# Patient Record
Sex: MALE | Race: WHITE
[De-identification: names, ages, dates, MRNs, and addresses within clinical notes are randomized per-mention and may not be internally consistent; named-entity substitution may affect disease eponyms.]

---

## 2017-01-01 ENCOUNTER — PRIOR ORIGINAL RECORDS (OUTPATIENT)
Dept: OTHER | Age: 82
End: 2017-01-01

## 2017-01-01 ENCOUNTER — HOSPITAL ENCOUNTER (OUTPATIENT)
Age: 82
Discharge: ANOTHER HOSPITAL | End: 2017-01-01
Attending: FAMILY MEDICINE
Payer: MEDICARE

## 2017-01-01 ENCOUNTER — OFFICE VISIT (OUTPATIENT)
Dept: FAMILY MEDICINE CLINIC | Facility: CLINIC | Age: 82
End: 2017-01-01

## 2017-01-01 ENCOUNTER — TELEPHONE (OUTPATIENT)
Dept: FAMILY MEDICINE CLINIC | Facility: CLINIC | Age: 82
End: 2017-01-01

## 2017-01-01 ENCOUNTER — CHARTING TRANS (OUTPATIENT)
Dept: OTHER | Age: 82
End: 2017-01-01

## 2017-01-01 ENCOUNTER — APPOINTMENT (OUTPATIENT)
Dept: GENERAL RADIOLOGY | Facility: HOSPITAL | Age: 82
DRG: 253 | End: 2017-01-01
Attending: SURGERY
Payer: MEDICARE

## 2017-01-01 ENCOUNTER — PATIENT OUTREACH (OUTPATIENT)
Dept: FAMILY MEDICINE CLINIC | Facility: CLINIC | Age: 82
End: 2017-01-01

## 2017-01-01 ENCOUNTER — NURSE ONLY (OUTPATIENT)
Dept: FAMILY MEDICINE CLINIC | Facility: CLINIC | Age: 82
End: 2017-01-01

## 2017-01-01 ENCOUNTER — LAB SERVICES (OUTPATIENT)
Dept: OTHER | Age: 82
End: 2017-01-01

## 2017-01-01 ENCOUNTER — OFFICE VISIT (OUTPATIENT)
Dept: WOUND CARE | Age: 82
End: 2017-01-01
Attending: NURSE PRACTITIONER
Payer: MEDICARE

## 2017-01-01 ENCOUNTER — OFFICE VISIT (OUTPATIENT)
Dept: WOUND CARE | Facility: HOSPITAL | Age: 82
End: 2017-01-01
Attending: SURGERY
Payer: MEDICARE

## 2017-01-01 ENCOUNTER — SURGERY (OUTPATIENT)
Age: 82
End: 2017-01-01

## 2017-01-01 ENCOUNTER — APPOINTMENT (OUTPATIENT)
Dept: LAB | Age: 82
End: 2017-01-01
Attending: INTERNAL MEDICINE
Payer: MEDICARE

## 2017-01-01 ENCOUNTER — HOSPITAL ENCOUNTER (OUTPATIENT)
Dept: INTERVENTIONAL RADIOLOGY/VASCULAR | Facility: HOSPITAL | Age: 82
Discharge: HOME | End: 2017-01-01
Attending: SURGERY | Admitting: SURGERY
Payer: MEDICARE

## 2017-01-01 ENCOUNTER — OFFICE VISIT (OUTPATIENT)
Dept: HEMATOLOGY/ONCOLOGY | Facility: HOSPITAL | Age: 82
End: 2017-01-01
Attending: INTERNAL MEDICINE
Payer: MEDICARE

## 2017-01-01 ENCOUNTER — ANESTHESIA (OUTPATIENT)
Dept: CARDIAC SURGERY | Facility: HOSPITAL | Age: 82
DRG: 253 | End: 2017-01-01
Payer: MEDICARE

## 2017-01-01 ENCOUNTER — MED REC SCAN ONLY (OUTPATIENT)
Dept: FAMILY MEDICINE CLINIC | Facility: CLINIC | Age: 82
End: 2017-01-01

## 2017-01-01 ENCOUNTER — OFFICE VISIT (OUTPATIENT)
Dept: WOUND CARE | Facility: HOSPITAL | Age: 82
End: 2017-01-01
Attending: NURSE PRACTITIONER
Payer: MEDICARE

## 2017-01-01 ENCOUNTER — HOSPITAL ENCOUNTER (INPATIENT)
Facility: HOSPITAL | Age: 82
LOS: 3 days | Discharge: NURSING, REHAB OR LONG-TERM CARE | DRG: 253 | End: 2017-01-01
Attending: SURGERY | Admitting: SURGERY
Payer: MEDICARE

## 2017-01-01 ENCOUNTER — TELEPHONE (OUTPATIENT)
Dept: HEMATOLOGY/ONCOLOGY | Facility: HOSPITAL | Age: 82
End: 2017-01-01

## 2017-01-01 ENCOUNTER — ANESTHESIA EVENT (OUTPATIENT)
Dept: CARDIAC SURGERY | Facility: HOSPITAL | Age: 82
DRG: 253 | End: 2017-01-01
Payer: MEDICARE

## 2017-01-01 ENCOUNTER — LAB ENCOUNTER (OUTPATIENT)
Dept: LAB | Age: 82
End: 2017-01-01
Attending: INTERNAL MEDICINE
Payer: MEDICARE

## 2017-01-01 VITALS
HEART RATE: 64 BPM | BODY MASS INDEX: 24.25 KG/M2 | SYSTOLIC BLOOD PRESSURE: 120 MMHG | RESPIRATION RATE: 14 BRPM | HEIGHT: 68 IN | OXYGEN SATURATION: 87 % | WEIGHT: 160 LBS | TEMPERATURE: 99 F | DIASTOLIC BLOOD PRESSURE: 62 MMHG

## 2017-01-01 VITALS
DIASTOLIC BLOOD PRESSURE: 72 MMHG | RESPIRATION RATE: 14 BRPM | BODY MASS INDEX: 25 KG/M2 | SYSTOLIC BLOOD PRESSURE: 138 MMHG | TEMPERATURE: 98 F | WEIGHT: 165 LBS | HEART RATE: 68 BPM

## 2017-01-01 VITALS
OXYGEN SATURATION: 94 % | SYSTOLIC BLOOD PRESSURE: 138 MMHG | HEIGHT: 65.5 IN | BODY MASS INDEX: 25.52 KG/M2 | TEMPERATURE: 97 F | DIASTOLIC BLOOD PRESSURE: 84 MMHG | HEART RATE: 60 BPM | WEIGHT: 155 LBS | RESPIRATION RATE: 12 BRPM

## 2017-01-01 VITALS
HEART RATE: 76 BPM | SYSTOLIC BLOOD PRESSURE: 109 MMHG | WEIGHT: 175 LBS | OXYGEN SATURATION: 76 % | HEIGHT: 65 IN | BODY MASS INDEX: 29.16 KG/M2 | DIASTOLIC BLOOD PRESSURE: 49 MMHG | RESPIRATION RATE: 32 BRPM

## 2017-01-01 VITALS
TEMPERATURE: 98 F | HEIGHT: 66 IN | HEART RATE: 60 BPM | RESPIRATION RATE: 20 BRPM | SYSTOLIC BLOOD PRESSURE: 113 MMHG | BODY MASS INDEX: 26.61 KG/M2 | WEIGHT: 165.56 LBS | OXYGEN SATURATION: 97 % | DIASTOLIC BLOOD PRESSURE: 57 MMHG

## 2017-01-01 VITALS
SYSTOLIC BLOOD PRESSURE: 140 MMHG | RESPIRATION RATE: 13 BRPM | WEIGHT: 180 LBS | HEART RATE: 60 BPM | TEMPERATURE: 98 F | HEIGHT: 68 IN | BODY MASS INDEX: 27.28 KG/M2 | DIASTOLIC BLOOD PRESSURE: 70 MMHG | OXYGEN SATURATION: 95 %

## 2017-01-01 VITALS
HEART RATE: 60 BPM | WEIGHT: 168 LBS | RESPIRATION RATE: 18 BRPM | DIASTOLIC BLOOD PRESSURE: 49 MMHG | HEIGHT: 68 IN | BODY MASS INDEX: 25.46 KG/M2 | SYSTOLIC BLOOD PRESSURE: 107 MMHG | OXYGEN SATURATION: 93 %

## 2017-01-01 VITALS
TEMPERATURE: 98 F | DIASTOLIC BLOOD PRESSURE: 50 MMHG | SYSTOLIC BLOOD PRESSURE: 110 MMHG | HEART RATE: 66 BPM | RESPIRATION RATE: 18 BRPM

## 2017-01-01 VITALS
HEART RATE: 70 BPM | TEMPERATURE: 97 F | SYSTOLIC BLOOD PRESSURE: 136 MMHG | OXYGEN SATURATION: 92 % | DIASTOLIC BLOOD PRESSURE: 64 MMHG

## 2017-01-01 DIAGNOSIS — I70.244 ATHEROSCLEROSIS OF NATIVE ARTERIES OF LEFT LEG WITH ULCERATION OF HEEL AND MIDFOOT (HCC): ICD-10-CM

## 2017-01-01 DIAGNOSIS — I10 ESSENTIAL HYPERTENSION, BENIGN: ICD-10-CM

## 2017-01-01 DIAGNOSIS — I70.245 ATHEROSCLEROSIS OF NATIVE ARTERIES OF LEFT LEG WITH ULCERATION OF OTHER PART OF FOOT (HCC): ICD-10-CM

## 2017-01-01 DIAGNOSIS — R06.03 ACUTE RESPIRATORY DISTRESS: Primary | ICD-10-CM

## 2017-01-01 DIAGNOSIS — I70.222 ATHEROSCLEROSIS OF NATIVE ARTERIES OF EXTREMITIES WITH REST PAIN, LEFT LEG (HCC): Primary | ICD-10-CM

## 2017-01-01 DIAGNOSIS — Z13.6 SCREENING FOR CARDIOVASCULAR CONDITION: ICD-10-CM

## 2017-01-01 DIAGNOSIS — M79.605 PAIN OF LEFT LOWER EXTREMITY: Primary | ICD-10-CM

## 2017-01-01 DIAGNOSIS — D51.8 VITAMIN B12 DEFICIENCY (DIETARY) ANEMIA: Primary | ICD-10-CM

## 2017-01-01 DIAGNOSIS — D69.6 THROMBOCYTOPENIA (HCC): ICD-10-CM

## 2017-01-01 DIAGNOSIS — Z79.01 LONG-TERM (CURRENT) USE OF ANTICOAGULANTS: ICD-10-CM

## 2017-01-01 DIAGNOSIS — I48.20 CHRONIC ATRIAL FIBRILLATION (HCC): ICD-10-CM

## 2017-01-01 DIAGNOSIS — I25.10 CORONARY ATHEROSCLEROSIS OF NATIVE CORONARY ARTERY: Primary | ICD-10-CM

## 2017-01-01 DIAGNOSIS — N30.01 ACUTE CYSTITIS WITH HEMATURIA: Primary | ICD-10-CM

## 2017-01-01 DIAGNOSIS — I50.9 ACUTE ON CHRONIC CONGESTIVE HEART FAILURE, UNSPECIFIED CONGESTIVE HEART FAILURE TYPE: ICD-10-CM

## 2017-01-01 DIAGNOSIS — R35.0 URINARY FREQUENCY: ICD-10-CM

## 2017-01-01 DIAGNOSIS — R53.83 FATIGUE, UNSPECIFIED TYPE: ICD-10-CM

## 2017-01-01 DIAGNOSIS — D64.9 ANEMIA, UNSPECIFIED TYPE: ICD-10-CM

## 2017-01-01 DIAGNOSIS — D64.9 ANEMIA, UNSPECIFIED TYPE: Primary | ICD-10-CM

## 2017-01-01 DIAGNOSIS — Z13.1 SCREENING FOR DIABETES MELLITUS (DM): ICD-10-CM

## 2017-01-01 DIAGNOSIS — R60.9 EDEMA: ICD-10-CM

## 2017-01-01 DIAGNOSIS — Z23 NEED FOR VACCINATION: ICD-10-CM

## 2017-01-01 DIAGNOSIS — L97.522 FOOT ULCER, LEFT, WITH FAT LAYER EXPOSED (HCC): Primary | ICD-10-CM

## 2017-01-01 DIAGNOSIS — Z13.31 DEPRESSION SCREENING: ICD-10-CM

## 2017-01-01 DIAGNOSIS — R04.2 HEMOPTYSIS: ICD-10-CM

## 2017-01-01 DIAGNOSIS — I70.244 ATHEROSCLEROSIS OF NATIVE ARTERIES OF LEFT LEG WITH ULCERATION OF HEEL AND MIDFOOT (HCC): Primary | ICD-10-CM

## 2017-01-01 DIAGNOSIS — Z00.00 ENCOUNTER FOR ANNUAL HEALTH EXAMINATION: Primary | ICD-10-CM

## 2017-01-01 DIAGNOSIS — R06.00 DYSPNEA, UNSPECIFIED TYPE: ICD-10-CM

## 2017-01-01 DIAGNOSIS — R53.81 MALAISE: Primary | ICD-10-CM

## 2017-01-01 DIAGNOSIS — I25.10 ASHD (ARTERIOSCLEROTIC HEART DISEASE): ICD-10-CM

## 2017-01-01 LAB
ALBUMIN SERPL-MCNC: 3.1 G/DL (ref 3.5–4.8)
ALBUMIN SERPL-MCNC: 3.9 G/DL (ref 3.5–4.8)
ALBUMIN: 3.6 G/DL
ALKALINE PHOSPHATATE(ALK PHOS): 107 IU/L
ALKALINE PHOSPHATATE(ALK PHOS): 149 IU/L
ALP LIVER SERPL-CCNC: 109 U/L (ref 45–117)
ALP LIVER SERPL-CCNC: 149 U/L (ref 45–117)
ALT SERPL-CCNC: 20 U/L (ref 17–63)
ALT SERPL-CCNC: 23 U/L (ref 17–63)
ANTIBODY SCREEN: NEGATIVE
APTT PPP: 34.8 SECONDS (ref 25–34)
AST SERPL-CCNC: 14 U/L (ref 15–41)
AST SERPL-CCNC: 16 U/L (ref 15–41)
ATRIAL RATE: 68 BPM
BASOPHILS # BLD AUTO: 0.01 X10(3) UL (ref 0–0.1)
BASOPHILS NFR BLD AUTO: 0.1 %
BASOPHILS NFR BLD AUTO: 0.1 %
BASOPHILS NFR BLD AUTO: 0.2 %
BASOPHILS NFR BLD AUTO: 0.2 %
BILIRUB SERPL-MCNC: 0.7 MG/DL (ref 0.1–2)
BILIRUB SERPL-MCNC: 0.8 MG/DL (ref 0.1–2)
BILIRUBIN TOTAL: 0.8 MG/DL
BILIRUBIN TOTAL: 0.8 MG/DL
BLOOD TYPE BARCODE: 5100
BUN BLD-MCNC: 42 MG/DL (ref 8–20)
BUN BLD-MCNC: 49 MG/DL (ref 8–20)
BUN BLD-MCNC: 50 MG/DL (ref 8–20)
BUN BLD-MCNC: 51 MG/DL (ref 8–20)
BUN: 45 MG/DL
BUN: 50 MG/DL
BUN: 51 MG/DL
CALCIUM BLD-MCNC: 8.4 MG/DL (ref 8.3–10.3)
CALCIUM BLD-MCNC: 8.6 MG/DL (ref 8.3–10.3)
CALCIUM BLD-MCNC: 8.8 MG/DL (ref 8.3–10.3)
CALCIUM BLD-MCNC: 9 MG/DL (ref 8.3–10.3)
CALCIUM: 8.6 MG/DL
CALCIUM: 8.7 MG/DL
CALCIUM: 8.8 MG/DL
CHLORIDE: 101 MEQ/L
CHLORIDE: 101 MMOL/L (ref 101–111)
CHLORIDE: 103 MEQ/L
CHLORIDE: 103 MMOL/L (ref 101–111)
CHLORIDE: 104 MEQ/L
CHLORIDE: 104 MMOL/L (ref 101–111)
CHLORIDE: 105 MMOL/L (ref 101–111)
CHOLESTEROL, TOTAL: 149 MG/DL
CO2: 30 MMOL/L (ref 22–32)
CO2: 30 MMOL/L (ref 22–32)
CO2: 31 MMOL/L (ref 22–32)
CO2: 34 MMOL/L (ref 22–32)
CREAT BLD-MCNC: 1.2 MG/DL (ref 0.7–1.3)
CREAT BLD-MCNC: 1.27 MG/DL (ref 0.7–1.3)
CREAT BLD-MCNC: 1.38 MG/DL (ref 0.7–1.3)
CREAT BLD-MCNC: 2.05 MG/DL (ref 0.7–1.3)
CREATININE, SERUM: 1.27 MG/DL
CREATININE, SERUM: 1.4 MG/DL
CREATININE, SERUM: 2.05 MG/DL
DEPRECATED HBV CORE AB SER IA-ACNC: 104.7 NG/ML (ref 22–322)
EOSINOPHIL # BLD AUTO: 0.12 X10(3) UL (ref 0–0.3)
EOSINOPHIL # BLD AUTO: 0.13 X10(3) UL (ref 0–0.3)
EOSINOPHIL # BLD AUTO: 0.2 X10(3) UL (ref 0–0.3)
EOSINOPHIL # BLD AUTO: 0.28 X10(3) UL (ref 0–0.3)
EOSINOPHIL NFR BLD AUTO: 1.7 %
EOSINOPHIL NFR BLD AUTO: 1.7 %
EOSINOPHIL NFR BLD AUTO: 4 %
EOSINOPHIL NFR BLD AUTO: 4.3 %
ERYTHROCYTE [DISTWIDTH] IN BLOOD BY AUTOMATED COUNT: 12.7 % (ref 11.5–16)
ERYTHROCYTE [DISTWIDTH] IN BLOOD BY AUTOMATED COUNT: 12.8 % (ref 11.5–16)
ERYTHROCYTE [DISTWIDTH] IN BLOOD BY AUTOMATED COUNT: 13.2 % (ref 11.5–16)
ERYTHROCYTE [DISTWIDTH] IN BLOOD BY AUTOMATED COUNT: 13.8 % (ref 11.5–16)
ERYTHROCYTE [DISTWIDTH] IN BLOOD BY AUTOMATED COUNT: 14 % (ref 11.5–16)
ERYTHROCYTE [DISTWIDTH] IN BLOOD BY AUTOMATED COUNT: 15.4 % (ref 11.5–16)
FOLATE (FOLIC ACID), SERUM: 19.4 NG/ML (ref 8.7–24)
GLUCOSE BLD-MCNC: 103 MG/DL (ref 70–99)
GLUCOSE BLD-MCNC: 111 MG/DL (ref 65–99)
GLUCOSE BLD-MCNC: 112 MG/DL (ref 65–99)
GLUCOSE BLD-MCNC: 115 MG/DL (ref 70–99)
GLUCOSE BLD-MCNC: 117 MG/DL (ref 70–99)
GLUCOSE BLD-MCNC: 152 MG/DL (ref 70–99)
GLUCOSE: 103 MG/DL
GLUCOSE: 103 MG/DL
GLUCOSE: 115 MG/DL
HAV AB SERPL IA-ACNC: 279 PG/ML (ref 193–986)
HCT VFR BLD AUTO: 24.5 % (ref 37–53)
HCT VFR BLD AUTO: 25.5 % (ref 37–53)
HCT VFR BLD AUTO: 31 % (ref 37–53)
HCT VFR BLD AUTO: 32.2 % (ref 37–53)
HCT VFR BLD AUTO: 37.8 % (ref 37–53)
HCT VFR BLD AUTO: 39.4 % (ref 37–53)
HDL CHOLESTEROL: 73 MG/DL
HEMATOCRIT: 24.5 %
HEMATOCRIT: 25.5 %
HEMATOCRIT: 35.7 %
HEMATOCRIT: 37.8 %
HEMOGLOBIN: 11.9 G/DL
HEMOGLOBIN: 12.8 G/DL
HEMOGLOBIN: 7.6 G/DL
HEMOGLOBIN: 8.1 G/DL
HGB BLD-MCNC: 10.9 G/DL (ref 13–17)
HGB BLD-MCNC: 12.8 G/DL (ref 13–17)
HGB BLD-MCNC: 13.3 G/DL (ref 13–17)
HGB BLD-MCNC: 7.6 G/DL (ref 13–17)
HGB BLD-MCNC: 8.1 G/DL (ref 13–17)
HGB BLD-MCNC: 9.6 G/DL (ref 13–17)
IMMATURE GRANULOCYTE COUNT: 0.02 X10(3) UL (ref 0–1)
IMMATURE GRANULOCYTE COUNT: 0.03 X10(3) UL (ref 0–1)
IMMATURE GRANULOCYTE COUNT: 0.04 X10(3) UL (ref 0–1)
IMMATURE GRANULOCYTE COUNT: 0.04 X10(3) UL (ref 0–1)
IMMATURE GRANULOCYTE RATIO %: 0.4 %
IMMATURE GRANULOCYTE RATIO %: 0.4 %
IMMATURE GRANULOCYTE RATIO %: 0.6 %
IMMATURE GRANULOCYTE RATIO %: 0.6 %
INR BLD: 1.27 (ref 0.89–1.12)
INR BLD: 1.33 (ref 0.89–1.12)
INR BLD: 1.34 (ref 0.89–1.12)
INR BLD: 4.22 (ref 0.89–1.11)
INR: 1.5 (ref 0.8–1.3)
ISTAT ACTIVATED CLOTTING TIME: 152 SECONDS (ref 74–137)
ISTAT ACTIVATED CLOTTING TIME: 224 SECONDS (ref 74–137)
ISTAT ACTIVATED CLOTTING TIME: 250 SECONDS (ref 74–137)
ISTAT BLOOD GAS BASE DEFICIT: -2 MMOL/L
ISTAT BLOOD GAS BASE EXCESS: 3 MMOL/L
ISTAT BLOOD GAS HCO3: 22.2 MEQ/L (ref 22–26)
ISTAT BLOOD GAS HCO3: 26.9 MEQ/L (ref 22–26)
ISTAT BLOOD GAS O2 SATURATION: 96 % (ref 92–100)
ISTAT BLOOD GAS O2 SATURATION: 99 % (ref 92–100)
ISTAT BLOOD GAS PCO2: 33 MMHG (ref 35–45)
ISTAT BLOOD GAS PCO2: 38 MMHG (ref 35–45)
ISTAT BLOOD GAS PH: 7.44 (ref 7.35–7.45)
ISTAT BLOOD GAS PH: 7.46 (ref 7.35–7.45)
ISTAT BLOOD GAS PO2: 136 MMHG (ref 80–105)
ISTAT BLOOD GAS PO2: 76 MMHG (ref 80–105)
ISTAT BLOOD GAS TCO2: 23 MMOL/L (ref 22–32)
ISTAT BLOOD GAS TCO2: 28 MMOL/L (ref 22–32)
ISTAT HEMATOCRIT: 32 % (ref 37–54)
ISTAT HEMATOCRIT: 33 % (ref 37–54)
ISTAT IONIZED CALCIUM: 1.1 MMOL/L (ref 1.12–1.32)
ISTAT IONIZED CALCIUM: 1.13 MMOL/L (ref 1.12–1.32)
ISTAT POTASSIUM: 3.8 MMOL/L (ref 3.6–5.1)
ISTAT POTASSIUM: 3.9 MMOL/L (ref 3.6–5.1)
ISTAT SODIUM: 139 MMOL/L (ref 136–144)
ISTAT SODIUM: 139 MMOL/L (ref 136–144)
LDL CHOLESTEROL: 66 MG/DL
LYMPHOCYTES # BLD AUTO: 0.46 X10(3) UL (ref 0.9–4)
LYMPHOCYTES # BLD AUTO: 0.52 X10(3) UL (ref 0.9–4)
LYMPHOCYTES # BLD AUTO: 0.82 X10(3) UL (ref 0.9–4)
LYMPHOCYTES # BLD AUTO: 0.89 X10(3) UL (ref 0.9–4)
LYMPHOCYTES NFR BLD AUTO: 11.7 %
LYMPHOCYTES NFR BLD AUTO: 11.9 %
LYMPHOCYTES NFR BLD AUTO: 8 %
LYMPHOCYTES NFR BLD AUTO: 9.2 %
M PROTEIN MFR SERPL ELPH: 6.1 G/DL (ref 6.1–8.3)
M PROTEIN MFR SERPL ELPH: 7 G/DL (ref 6.1–8.3)
MCH RBC QN AUTO: 32.4 PG (ref 27–33.2)
MCH RBC QN AUTO: 32.6 PG (ref 27–33.2)
MCH RBC QN AUTO: 33.7 PG (ref 27–33.2)
MCH RBC QN AUTO: 33.8 PG (ref 27–33.2)
MCH RBC QN AUTO: 33.9 PG (ref 27–33.2)
MCH RBC QN AUTO: 34.2 PG (ref 27–33.2)
MCHC RBC AUTO-ENTMCNC: 31 G/DL (ref 31–37)
MCHC RBC AUTO-ENTMCNC: 31 G/DL (ref 31–37)
MCHC RBC AUTO-ENTMCNC: 31.8 G/DL (ref 31–37)
MCHC RBC AUTO-ENTMCNC: 33.8 G/DL (ref 31–37)
MCHC RBC AUTO-ENTMCNC: 33.9 G/DL (ref 31–37)
MCHC RBC AUTO-ENTMCNC: 33.9 G/DL (ref 31–37)
MCV RBC AUTO: 100 FL (ref 80–99)
MCV RBC AUTO: 101.1 FL (ref 80–99)
MCV RBC AUTO: 102 FL (ref 80–99)
MCV RBC AUTO: 105.2 FL (ref 80–99)
MCV RBC AUTO: 109.2 FL (ref 80–99)
MCV RBC AUTO: 99.7 FL (ref 80–99)
MMA: 0.57 UMOL/L
MONOCYTES # BLD AUTO: 0.66 X10(3) UL (ref 0.1–0.6)
MONOCYTES # BLD AUTO: 0.7 X10(3) UL (ref 0.1–0.6)
MONOCYTES # BLD AUTO: 0.74 X10(3) UL (ref 0.1–0.6)
MONOCYTES # BLD AUTO: 0.75 X10(3) UL (ref 0.1–0.6)
MONOCYTES NFR BLD AUTO: 10 %
MONOCYTES NFR BLD AUTO: 10.5 %
MONOCYTES NFR BLD AUTO: 10.7 %
MONOCYTES NFR BLD AUTO: 13.2 %
MRSA SCREEN PCR: NORMAL
NEUTROPHIL ABS PRELIM: 3.65 X10 (3) UL (ref 1.3–6.7)
NEUTROPHIL ABS PRELIM: 4.97 X10 (3) UL (ref 1.3–6.7)
NEUTROPHIL ABS PRELIM: 5.29 X10 (3) UL (ref 1.3–6.7)
NEUTROPHIL ABS PRELIM: 5.7 X10 (3) UL (ref 1.3–6.7)
NEUTROPHILS # BLD AUTO: 3.65 X10(3) UL (ref 1.3–6.7)
NEUTROPHILS # BLD AUTO: 4.97 X10(3) UL (ref 1.3–6.7)
NEUTROPHILS # BLD AUTO: 5.29 X10(3) UL (ref 1.3–6.7)
NEUTROPHILS # BLD AUTO: 5.7 X10(3) UL (ref 1.3–6.7)
NEUTROPHILS NFR BLD AUTO: 73 %
NEUTROPHILS NFR BLD AUTO: 75.4 %
NEUTROPHILS NFR BLD AUTO: 75.9 %
NEUTROPHILS NFR BLD AUTO: 76.2 %
PLATELET # BLD AUTO: 115 10(3)UL (ref 150–450)
PLATELET # BLD AUTO: 116 10(3)UL (ref 150–450)
PLATELET # BLD AUTO: 124 10(3)UL (ref 150–450)
PLATELET # BLD AUTO: 126 10(3)UL (ref 150–450)
PLATELET # BLD AUTO: 137 10(3)UL (ref 150–450)
PLATELET # BLD AUTO: 142 10(3)UL (ref 150–450)
PLATELETS: 125 K/UL
PLATELETS: 126 K/UL
PLATELETS: 137 K/UL
PLATELETS: 142 K/UL
POTASSIUM SERPL-SCNC: 4.1 MMOL/L (ref 3.6–5.1)
POTASSIUM SERPL-SCNC: 4.1 MMOL/L (ref 3.6–5.1)
POTASSIUM SERPL-SCNC: 4.6 MMOL/L (ref 3.6–5.1)
POTASSIUM SERPL-SCNC: 5.3 MMOL/L (ref 3.6–5.1)
POTASSIUM, SERUM: 4.1 MEQ/L
POTASSIUM, SERUM: 4.4 MEQ/L
POTASSIUM, SERUM: 5.3 MEQ/L
PROTEIN, TOTAL: 6.3 G/DL
PSA SERPL DL<=0.01 NG/ML-MCNC: 16.3 SECONDS (ref 12.3–14.8)
PSA SERPL DL<=0.01 NG/ML-MCNC: 16.9 SECONDS (ref 12.3–14.8)
PSA SERPL DL<=0.01 NG/ML-MCNC: 17 SECONDS (ref 12.3–14.8)
PSA SERPL DL<=0.01 NG/ML-MCNC: 41.8 SECONDS (ref 12–14.3)
Q-T INTERVAL: 480 MS
QRS DURATION: 202 MS
QTC CALCULATION (BEZET): 483 MS
R AXIS: -81 DEGREES
RBC # BLD AUTO: 2.33 X10(6)UL (ref 3.8–5.8)
RBC # BLD AUTO: 2.5 X10(6)UL (ref 3.8–5.8)
RBC # BLD AUTO: 2.84 X10(6)UL (ref 3.8–5.8)
RBC # BLD AUTO: 3.22 X10(6)UL (ref 3.8–5.8)
RBC # BLD AUTO: 3.74 X10(6)UL (ref 3.8–5.8)
RBC # BLD AUTO: 3.95 X10(6)UL (ref 3.8–5.8)
RED BLOOD COUNT: 2.33 X 10-6/U
RED BLOOD COUNT: 2.5 X 10-6/U
RED BLOOD COUNT: 3.61 X 10-6/U
RED BLOOD COUNT: 3.74 X 10-6/U
RED CELL DISTRIBUTION WIDTH-SD: 46.5 FL (ref 35.1–46.3)
RED CELL DISTRIBUTION WIDTH-SD: 46.7 FL (ref 35.1–46.3)
RED CELL DISTRIBUTION WIDTH-SD: 49.3 FL (ref 35.1–46.3)
RED CELL DISTRIBUTION WIDTH-SD: 51.4 FL (ref 35.1–46.3)
RED CELL DISTRIBUTION WIDTH-SD: 53.6 FL (ref 35.1–46.3)
RED CELL DISTRIBUTION WIDTH-SD: 62.2 FL (ref 35.1–46.3)
RETIC ABS CALC AUTO: 70.3 X10(3) UL (ref 22.5–147.5)
RETIC IRF CALC: 0.23 RATIO (ref 0.09–0.3)
RETIC%: 2.8 % (ref 0.5–2.5)
RETICULOCYTE HEMOGLOBIN EQUIVALENT: 28.2 PG (ref 28.2–36.3)
RH BLOOD TYPE: POSITIVE
SGOT (AST): 14 IU/L
SGOT (AST): 19 IU/L
SGPT (ALT): 20 IU/L
SGPT (ALT): 21 IU/L
SODIUM SERPL-SCNC: 136 MMOL/L (ref 136–144)
SODIUM SERPL-SCNC: 140 MMOL/L (ref 136–144)
SODIUM SERPL-SCNC: 142 MMOL/L (ref 136–144)
SODIUM SERPL-SCNC: 144 MMOL/L (ref 136–144)
SODIUM: 136 MEQ/L
SODIUM: 137 MEQ/L
SODIUM: 144 MEQ/L
T AXIS: 83 DEGREES
TRIGLYCERIDES: 50 MG/DL
TXT: NORMAL
VENTRICULAR RATE: 61 BPM
WBC # BLD AUTO: 10.3 X10(3) UL (ref 4–13)
WBC # BLD AUTO: 5 X10(3) UL (ref 4–13)
WBC # BLD AUTO: 5.9 X10(3) UL (ref 4–13)
WBC # BLD AUTO: 6.5 X10(3) UL (ref 4–13)
WBC # BLD AUTO: 7 X10(3) UL (ref 4–13)
WBC # BLD AUTO: 7.5 X10(3) UL (ref 4–13)
WHITE BLOOD COUNT: 5 X 10-3/U
WHITE BLOOD COUNT: 5.7 X 10-3/U
WHITE BLOOD COUNT: 5.9 X 10-3/U
WHITE BLOOD COUNT: 6.5 X 10-3/U

## 2017-01-01 PROCEDURE — 97597 DBRDMT OPN WND 1ST 20 CM/<: CPT

## 2017-01-01 PROCEDURE — 99213 OFFICE O/P EST LOW 20 MIN: CPT

## 2017-01-01 PROCEDURE — 82607 VITAMIN B-12: CPT | Performed by: FAMILY MEDICINE

## 2017-01-01 PROCEDURE — 36200 PLACE CATHETER IN AORTA: CPT

## 2017-01-01 PROCEDURE — G0444 DEPRESSION SCREEN ANNUAL: HCPCS | Performed by: FAMILY MEDICINE

## 2017-01-01 PROCEDURE — 85025 COMPLETE CBC W/AUTO DIFF WBC: CPT | Performed by: FAMILY MEDICINE

## 2017-01-01 PROCEDURE — 80053 COMPREHEN METABOLIC PANEL: CPT | Performed by: FAMILY MEDICINE

## 2017-01-01 PROCEDURE — 99215 OFFICE O/P EST HI 40 MIN: CPT

## 2017-01-01 PROCEDURE — 75774 ARTERY X-RAY EACH VESSEL: CPT

## 2017-01-01 PROCEDURE — 99203 OFFICE O/P NEW LOW 30 MIN: CPT

## 2017-01-01 PROCEDURE — 85027 COMPLETE CBC AUTOMATED: CPT | Performed by: SURGERY

## 2017-01-01 PROCEDURE — 99222 1ST HOSP IP/OBS MODERATE 55: CPT | Performed by: HOSPITALIST

## 2017-01-01 PROCEDURE — 83921 ORGANIC ACID SINGLE QUANT: CPT | Performed by: FAMILY MEDICINE

## 2017-01-01 PROCEDURE — 99152 MOD SED SAME PHYS/QHP 5/>YRS: CPT

## 2017-01-01 PROCEDURE — 85025 COMPLETE CBC W/AUTO DIFF WBC: CPT

## 2017-01-01 PROCEDURE — 87086 URINE CULTURE/COLONY COUNT: CPT | Performed by: FAMILY MEDICINE

## 2017-01-01 PROCEDURE — 85610 PROTHROMBIN TIME: CPT

## 2017-01-01 PROCEDURE — 96372 THER/PROPH/DIAG INJ SC/IM: CPT | Performed by: FAMILY MEDICINE

## 2017-01-01 PROCEDURE — 11042 DBRDMT SUBQ TIS 1ST 20SQCM/<: CPT

## 2017-01-01 PROCEDURE — 99214 OFFICE O/P EST MOD 30 MIN: CPT | Performed by: FAMILY MEDICINE

## 2017-01-01 PROCEDURE — 99214 OFFICE O/P EST MOD 30 MIN: CPT

## 2017-01-01 PROCEDURE — 71010 XR CHEST AP PORTABLE  (CPT=71010): CPT

## 2017-01-01 PROCEDURE — 82746 ASSAY OF FOLIC ACID SERUM: CPT | Performed by: FAMILY MEDICINE

## 2017-01-01 PROCEDURE — 93005 ELECTROCARDIOGRAM TRACING: CPT

## 2017-01-01 PROCEDURE — B41D1ZZ FLUOROSCOPY OF AORTA AND BILATERAL LOWER EXTREMITY ARTERIES USING LOW OSMOLAR CONTRAST: ICD-10-PCS | Performed by: SURGERY

## 2017-01-01 PROCEDURE — 80061 LIPID PANEL: CPT | Performed by: FAMILY MEDICINE

## 2017-01-01 PROCEDURE — 99232 SBSQ HOSP IP/OBS MODERATE 35: CPT | Performed by: HOSPITALIST

## 2017-01-01 PROCEDURE — 041L0JL BYPASS LEFT FEMORAL ARTERY TO POPLITEAL ARTERY WITH SYNTHETIC SUBSTITUTE, OPEN APPROACH: ICD-10-PCS | Performed by: SURGERY

## 2017-01-01 PROCEDURE — 36415 COLL VENOUS BLD VENIPUNCTURE: CPT

## 2017-01-01 PROCEDURE — 93010 ELECTROCARDIOGRAM REPORT: CPT | Performed by: INTERNAL MEDICINE

## 2017-01-01 PROCEDURE — 85027 COMPLETE CBC AUTOMATED: CPT | Performed by: FAMILY MEDICINE

## 2017-01-01 PROCEDURE — 99205 OFFICE O/P NEW HI 60 MIN: CPT | Performed by: INTERNAL MEDICINE

## 2017-01-01 PROCEDURE — 36247 INS CATH ABD/L-EXT ART 3RD: CPT

## 2017-01-01 PROCEDURE — 99153 MOD SED SAME PHYS/QHP EA: CPT

## 2017-01-01 PROCEDURE — 90670 PCV13 VACCINE IM: CPT | Performed by: FAMILY MEDICINE

## 2017-01-01 PROCEDURE — G0009 ADMIN PNEUMOCOCCAL VACCINE: HCPCS | Performed by: FAMILY MEDICINE

## 2017-01-01 PROCEDURE — 80048 BASIC METABOLIC PNL TOTAL CA: CPT | Performed by: SURGERY

## 2017-01-01 PROCEDURE — 75710 ARTERY X-RAYS ARM/LEG: CPT

## 2017-01-01 PROCEDURE — G0439 PPPS, SUBSEQ VISIT: HCPCS | Performed by: FAMILY MEDICINE

## 2017-01-01 DEVICE — GRAFT PROPATEN THIN 6MMX80CM: Type: IMPLANTABLE DEVICE | Site: LEG | Status: FUNCTIONAL

## 2017-01-01 RX ORDER — ALBUTEROL SULFATE 90 UG/1
2 AEROSOL, METERED RESPIRATORY (INHALATION) EVERY 4 HOURS PRN
Status: DISCONTINUED | OUTPATIENT
Start: 2017-01-01 | End: 2017-01-01

## 2017-01-01 RX ORDER — DIGOXIN 125 MCG
125 TABLET ORAL DAILY
Status: DISCONTINUED | OUTPATIENT
Start: 2017-01-01 | End: 2017-01-01

## 2017-01-01 RX ORDER — CEFAZOLIN SODIUM 1 G/3ML
INJECTION, POWDER, FOR SOLUTION INTRAMUSCULAR; INTRAVENOUS
Status: COMPLETED
Start: 2017-01-01 | End: 2017-01-01

## 2017-01-01 RX ORDER — SULFAMETHOXAZOLE AND TRIMETHOPRIM 800; 160 MG/1; MG/1
1 TABLET ORAL 2 TIMES DAILY
Qty: 20 TABLET | Refills: 0 | Status: SHIPPED | OUTPATIENT
Start: 2017-01-01 | End: 2017-01-01

## 2017-01-01 RX ORDER — CELECOXIB 200 MG/1
CAPSULE ORAL
Qty: 180 CAPSULE | Refills: 0 | Status: SHIPPED | OUTPATIENT
Start: 2017-01-01 | End: 2017-01-01

## 2017-01-01 RX ORDER — LISINOPRIL AND HYDROCHLOROTHIAZIDE 12.5; 1 MG/1; MG/1
TABLET ORAL
Qty: 30 TABLET | Refills: 11 | Status: SHIPPED | OUTPATIENT
Start: 2017-01-01

## 2017-01-01 RX ORDER — AMLODIPINE BESYLATE 5 MG/1
5 TABLET ORAL DAILY
Status: DISCONTINUED | OUTPATIENT
Start: 2017-01-01 | End: 2017-01-01

## 2017-01-01 RX ORDER — HYDROCODONE BITARTRATE AND ACETAMINOPHEN 5; 325 MG/1; MG/1
1 TABLET ORAL EVERY 6 HOURS PRN
Qty: 100 TABLET | Refills: 0 | Status: SHIPPED | OUTPATIENT
Start: 2017-01-01 | End: 2017-01-01

## 2017-01-01 RX ORDER — ACETAMINOPHEN 325 MG/1
650 TABLET ORAL EVERY 6 HOURS PRN
Status: DISCONTINUED | OUTPATIENT
Start: 2017-01-01 | End: 2017-01-01

## 2017-01-01 RX ORDER — CELECOXIB 200 MG/1
CAPSULE ORAL
Qty: 180 CAPSULE | Refills: 0 | Status: SHIPPED | OUTPATIENT
Start: 2017-01-01

## 2017-01-01 RX ORDER — NALOXONE HYDROCHLORIDE 0.4 MG/ML
80 INJECTION, SOLUTION INTRAMUSCULAR; INTRAVENOUS; SUBCUTANEOUS AS NEEDED
Status: ACTIVE | OUTPATIENT
Start: 2017-01-01 | End: 2017-01-01

## 2017-01-01 RX ORDER — ASPIRIN 300 MG
300 SUPPOSITORY, RECTAL RECTAL DAILY
Status: DISCONTINUED | OUTPATIENT
Start: 2017-01-01 | End: 2017-01-01

## 2017-01-01 RX ORDER — SODIUM CHLORIDE 9 MG/ML
INJECTION, SOLUTION INTRAVENOUS CONTINUOUS
Status: DISCONTINUED | OUTPATIENT
Start: 2017-01-01 | End: 2017-01-01

## 2017-01-01 RX ORDER — HYDROCODONE BITARTRATE AND ACETAMINOPHEN 5; 325 MG/1; MG/1
2 TABLET ORAL EVERY 6 HOURS PRN
Status: DISCONTINUED | OUTPATIENT
Start: 2017-01-01 | End: 2017-01-01

## 2017-01-01 RX ORDER — ASPIRIN 325 MG
325 TABLET, DELAYED RELEASE (ENTERIC COATED) ORAL DAILY
Status: DISCONTINUED | OUTPATIENT
Start: 2017-01-01 | End: 2017-01-01

## 2017-01-01 RX ORDER — CARVEDILOL 12.5 MG/1
25 TABLET ORAL 2 TIMES DAILY WITH MEALS
Status: DISCONTINUED | OUTPATIENT
Start: 2017-01-01 | End: 2017-01-01

## 2017-01-01 RX ORDER — ENOXAPARIN SODIUM 100 MG/ML
40 INJECTION SUBCUTANEOUS NIGHTLY
Status: DISCONTINUED | OUTPATIENT
Start: 2017-01-01 | End: 2017-01-01

## 2017-01-01 RX ORDER — WARFARIN SODIUM 2 MG/1
2 TABLET ORAL NIGHTLY
Status: DISCONTINUED | OUTPATIENT
Start: 2017-01-01 | End: 2017-01-01

## 2017-01-01 RX ORDER — NALOXONE HYDROCHLORIDE 0.4 MG/ML
80 INJECTION, SOLUTION INTRAMUSCULAR; INTRAVENOUS; SUBCUTANEOUS AS NEEDED
Status: DISPENSED | OUTPATIENT
Start: 2017-01-01 | End: 2017-01-01

## 2017-01-01 RX ORDER — LISINOPRIL AND HYDROCHLOROTHIAZIDE 12.5; 1 MG/1; MG/1
1 TABLET ORAL DAILY
Status: DISCONTINUED | OUTPATIENT
Start: 2017-01-01 | End: 2017-01-01

## 2017-01-01 RX ORDER — HEPARIN SODIUM 5000 [USP'U]/ML
INJECTION, SOLUTION INTRAVENOUS; SUBCUTANEOUS
Status: COMPLETED
Start: 2017-01-01 | End: 2017-01-01

## 2017-01-01 RX ORDER — FOLIC ACID 1 MG/1
1 TABLET ORAL DAILY
Status: DISCONTINUED | OUTPATIENT
Start: 2017-01-01 | End: 2017-01-01

## 2017-01-01 RX ORDER — ASPIRIN 81 MG/1
81 TABLET ORAL DAILY
Status: DISCONTINUED | OUTPATIENT
Start: 2017-01-01 | End: 2017-01-01

## 2017-01-01 RX ORDER — LEVOFLOXACIN 500 MG/1
500 TABLET, FILM COATED ORAL DAILY
Qty: 10 TABLET | Refills: 0 | Status: SHIPPED | OUTPATIENT
Start: 2017-01-01 | End: 2017-01-01

## 2017-01-01 RX ORDER — HYDROMORPHONE HYDROCHLORIDE 1 MG/ML
0.8 INJECTION, SOLUTION INTRAMUSCULAR; INTRAVENOUS; SUBCUTANEOUS EVERY 2 HOUR PRN
Status: DISCONTINUED | OUTPATIENT
Start: 2017-01-01 | End: 2017-01-01

## 2017-01-01 RX ORDER — PREDNISONE 20 MG/1
40 TABLET ORAL DAILY
Qty: 10 TABLET | Refills: 0 | Status: SHIPPED | OUTPATIENT
Start: 2017-01-01 | End: 2017-01-01

## 2017-01-01 RX ORDER — HYDROCODONE BITARTRATE AND ACETAMINOPHEN 5; 325 MG/1; MG/1
1 TABLET ORAL EVERY 6 HOURS PRN
COMMUNITY
End: 2017-01-01

## 2017-01-01 RX ORDER — BUPIVACAINE HYDROCHLORIDE 5 MG/ML
INJECTION, SOLUTION EPIDURAL; INTRACAUDAL AS NEEDED
Status: DISCONTINUED | OUTPATIENT
Start: 2017-01-01 | End: 2017-01-01

## 2017-01-01 RX ORDER — HYDROCODONE BITARTRATE AND ACETAMINOPHEN 5; 325 MG/1; MG/1
1 TABLET ORAL EVERY 6 HOURS PRN
Status: DISCONTINUED | OUTPATIENT
Start: 2017-01-01 | End: 2017-01-01

## 2017-01-01 RX ORDER — LIDOCAINE HYDROCHLORIDE 10 MG/ML
INJECTION, SOLUTION INFILTRATION; PERINEURAL
Status: COMPLETED
Start: 2017-01-01 | End: 2017-01-01

## 2017-01-01 RX ORDER — MIDAZOLAM HYDROCHLORIDE 1 MG/ML
INJECTION INTRAMUSCULAR; INTRAVENOUS
Status: COMPLETED
Start: 2017-01-01 | End: 2017-01-01

## 2017-01-01 RX ORDER — CYANOCOBALAMIN 1000 UG/ML
1000 INJECTION INTRAMUSCULAR; SUBCUTANEOUS ONCE
Status: COMPLETED | OUTPATIENT
Start: 2017-01-01 | End: 2017-01-01

## 2017-01-01 RX ORDER — HYDROCODONE BITARTRATE AND ACETAMINOPHEN 5; 325 MG/1; MG/1
1 TABLET ORAL EVERY 6 HOURS PRN
Qty: 100 TABLET | Refills: 0 | Status: SHIPPED | OUTPATIENT
Start: 2017-01-01

## 2017-01-01 RX ORDER — HYDROMORPHONE HYDROCHLORIDE 1 MG/ML
0.4 INJECTION, SOLUTION INTRAMUSCULAR; INTRAVENOUS; SUBCUTANEOUS EVERY 2 HOUR PRN
Status: DISCONTINUED | OUTPATIENT
Start: 2017-01-01 | End: 2017-01-01

## 2017-01-01 RX ORDER — LIDOCAINE HYDROCHLORIDE 10 MG/ML
INJECTION, SOLUTION EPIDURAL; INFILTRATION; INTRACAUDAL; PERINEURAL
Status: COMPLETED
Start: 2017-01-01 | End: 2017-01-01

## 2017-01-01 RX ORDER — HYDROMORPHONE HYDROCHLORIDE 1 MG/ML
1.2 INJECTION, SOLUTION INTRAMUSCULAR; INTRAVENOUS; SUBCUTANEOUS EVERY 2 HOUR PRN
Status: DISCONTINUED | OUTPATIENT
Start: 2017-01-01 | End: 2017-01-01

## 2017-01-01 RX ORDER — ONDANSETRON 2 MG/ML
4 INJECTION INTRAMUSCULAR; INTRAVENOUS EVERY 6 HOURS PRN
Status: DISCONTINUED | OUTPATIENT
Start: 2017-01-01 | End: 2017-01-01

## 2017-01-01 RX ORDER — HYDROMORPHONE HYDROCHLORIDE 1 MG/ML
0.4 INJECTION, SOLUTION INTRAMUSCULAR; INTRAVENOUS; SUBCUTANEOUS EVERY 5 MIN PRN
Status: DISCONTINUED | OUTPATIENT
Start: 2017-01-01 | End: 2017-01-01

## 2017-01-01 RX ORDER — HYDROMORPHONE HYDROCHLORIDE 1 MG/ML
0.4 INJECTION, SOLUTION INTRAMUSCULAR; INTRAVENOUS; SUBCUTANEOUS EVERY 5 MIN PRN
Status: DISPENSED | OUTPATIENT
Start: 2017-01-01 | End: 2017-01-01

## 2017-01-01 RX ORDER — SODIUM CHLORIDE, SODIUM LACTATE, POTASSIUM CHLORIDE, CALCIUM CHLORIDE 600; 310; 30; 20 MG/100ML; MG/100ML; MG/100ML; MG/100ML
INJECTION, SOLUTION INTRAVENOUS CONTINUOUS
Status: DISCONTINUED | OUTPATIENT
Start: 2017-01-01 | End: 2017-01-01

## 2017-01-01 RX ORDER — DEXTROSE AND SODIUM CHLORIDE 5; .45 G/100ML; G/100ML
INJECTION, SOLUTION INTRAVENOUS CONTINUOUS
Status: DISCONTINUED | OUTPATIENT
Start: 2017-01-01 | End: 2017-01-01

## 2017-01-01 RX ADMIN — SODIUM CHLORIDE: 9 INJECTION, SOLUTION INTRAVENOUS at 12:05:00

## 2017-01-01 RX ADMIN — CYANOCOBALAMIN 1000 MCG: 1000 INJECTION INTRAMUSCULAR; SUBCUTANEOUS at 15:34:00

## 2017-01-01 RX ADMIN — SODIUM CHLORIDE: 9 INJECTION, SOLUTION INTRAVENOUS at 15:00:00

## 2017-01-12 PROBLEM — I77.9 ARTERIAL DISEASE (HCC): Status: ACTIVE | Noted: 2017-01-01

## 2017-01-12 PROBLEM — M79.605 PAIN OF LEFT LOWER EXTREMITY: Status: ACTIVE | Noted: 2017-01-01

## 2017-01-20 NOTE — PROGRESS NOTES
CC: heel    HPI:     Location left side, posterior  Quality ulcerate  Severity moderate  Duration chronic  Context started out as some cracks in skin    ROS: no fever or chills, some drainage, no edema    Past Medical History   Diagnosis Date   • Unspecifi

## 2017-01-20 NOTE — TELEPHONE ENCOUNTER
Patients wife Frankey Rave calling stating that patient has three cracks on his left foot that are filled with pus. Area is red and painful. Declines fever. Patient is having issues applying pressure to the foot.   Patient wife states that patient has a high abbey

## 2017-01-27 NOTE — TELEPHONE ENCOUNTER
Tried to contact Bhavna at [facility] wound [facility] - missed call from their office. Unable to leave voice message.

## 2017-01-27 NOTE — TELEPHONE ENCOUNTER
Pt would to know if it is ok to wait to have his bone scan done after he has his stent procedure with Dr. Zachary Leone  Please advise if it is ok to wait.

## 2017-01-27 NOTE — TELEPHONE ENCOUNTER
It is better if he has it done prior to any procedure. If he has ongoing infection into his bone, this would put him at higher risk for complications for any procedure.

## 2017-01-30 NOTE — PROGRESS NOTES
Progress Note Details  Patient Name: Arabella Arroyo  Date: 1/23/2017   Patient Number: [id number] Physician / Tish King: Judith Cantu   Patient YOB: 1933 Facility: [facility]    Chief Complaint  This information was obtained 1/23/17: Initial visit to wound care. This patient arrives to the wound care with ulcerations to the left foot 5th toe and third toe and left heel. History of a left leg fracture with mike placement.  Went to Ortho, no pulse detected at that time and was ins Integumentary (Hair/Skin/Nails): Prone to Skin Tears (on coumadin), Change: Hair, Nails, Skin (No hair growth to lower legs, thin skin), Lumps (left shin location of mike), Ulcers (arterial ulcers to left heel and toes)    Patient denies complaints or sympt AICD left chest. PMI wnl. irregular. Nonpalpable pulses to the left foot. DP weak monophasic signal with hand held doppler, PT unable to obtain signal. Right DP PT palpable and with biphasic signals. . Capillary refill > 3sec left, cooler to touch when comp The periwound skin exhibited: Edema, Moist, Erythema. The temperature of the periwound skin is Warm. Periwound skin does not exhibit signs or symptoms of infection. Local Pulse is Weak.     Wound #3 Left Heel is an acute Full Thickness Arterial Ulcer and ha Betadine - paint all open areas  Thick Foam - in between the third and fourth toes and fourth and fifth toes.   Dry Gauze - TO heel  Kerlix  Paper tape    Follow-Up Appointments  Other: - Tomorrow in the wound care center to see Dr Aden Hudson #2 Left, Upon arrival and initial assessment pulse was felt in the left foot. Pulse was unobtainable in the PT and difficult to detect in the DP with hand held doppler after 10 minutes of being up on exam cart.  Ulcerations are stable but there is concern regarding Sanjeev Cross MSN, FNP-BC, [place], [place] 01/23/2017 20:10:33        Entered By: Sanjeev Cross on 01/23/2017 20:10:09

## 2017-01-31 NOTE — BRIEF OP NOTE
[facility]  Brief Endovascular Procedure Note     Cata Viramontes Location: CATH LAB   CSN [id number] MRN [id number]   Admission Date 1/31/2017 Operation Date 1/31/2017   Attending Physician Alexandrea Arrington MD Operating Physician Stacey James MD

## 2017-01-31 NOTE — PROGRESS NOTES
Swati Prater, [address] [facility]  Wound Care Clinic    VASCULAR SURGERY CONSULT NOTE    Date of visit: 2017  Name: Gavin Miller   :   1933  [id number]     REFERRING PHYSICIAN:  Marlney ref. PATIENT [place] PREOPERATIVE ORDER FOR IV ANTIBIOTIC SURGICAL SITE INFECTION PROPHYLAXIS.   10/3/2012    Comment Procedure: CAUDAL;  Surgeon: Mary Brock MD;  Location: [facility] FOR PAIN MANAGEMENT    PATIENT DOCUMENTED NOT TO HAVE EXPERIENCED A W/WO CONTRAST, DX/THERAPEUTIC SUBSTANCE, EPIDURAL/SUBARACHNOID; LUMBAR/SACRAL  6/19/2013    Comment Procedure: CAUDAL;  Surgeon: Leaan Glasgow MD;  Location: [address] NDL/CATH SPI DX/THER [address]  6/19/2013    Co EPIDURAL/SUBARACHNOID; LUMBAR/SACRAL  1/17/2014    Comment Procedure: CAUDAL;  Surgeon: Jaci Mandel MD;  Location: [address]ion[address] NDL/CATH SPI DX/THER NJX  1/17/2014    Comment Procedure: LUMBAR EPIDURAL;  Delmas Petty INFECTION PROPHYLAXIS.  N/A 7/9/2014    Comment Procedure: CAUDAL;  Surgeon: Astrid Maki MD;  Location: [facility] PAIN MANAGEMENT    PATIENT DOCUMENTED NOT TO HAVE EXPERIENCED ANY OF THE FOLLOWING EVENTS N/A 7/9/2014    Comment Procedure: CAUDAL; EPIDURAL/SUBARACHNOID; LUMBAR/SACRAL N/A 9/25/2015    Comment Procedure: CAUDAL;  Surgeon: Judi Suh MD;  Location: [facility] FOR PAIN MANAGEMENT    PATIENT [address] FOR IV ANTIBIOTIC SURGICAL SITE INFECTION PROPHYLAXIS.  N/A 9/2 MANAGEMENT        MEDICATIONS:     Current outpatient prescriptions:   •  HYDROcodone-acetaminophen (NORCO) 5-325 MG Oral Tab, Take 1 tablet by mouth every 6 (six) hours as needed for Pain., Disp: 100 tablet, Rfl: 0  •  Albuterol Sulfate HFA (PROAIR HFA) 1 apparent distress  PSYCH: normal mood and affect  HEENT: ears and throat are clear  NECK: supple, no lymphadenopathy, thyroid wnl  CAROTID: No bruits  RESPIRATORY: no rales, rhonchi, or wheezes B  CARDIO: RRR without murmur, no murmur, no gallop   ABDOMEN: questions. Sincerely,  Swati Lezama MD

## 2017-02-01 NOTE — PROGRESS NOTES
Rc'd pt from RN. Bedrest completed and pt stood up and made a couple of steps, pt does not ambulate. Groin stable. Reinforced previously reviewed d/c instructions. All questions answered. Home with wife via w/c in stable condition without c/o.

## 2017-02-06 PROBLEM — I70.244 ATHEROSCLEROSIS OF NATIVE ARTERY OF LEFT LOWER EXTREMITY WITH ULCERATION OF HEEL (HCC): Status: ACTIVE | Noted: 2017-01-01

## 2017-02-14 NOTE — TELEPHONE ENCOUNTER
Pt she got a hold of the physican regarding his surgery. Pt no longer had questions regarding surgery. Pt will  Call office if anything else is needed.

## 2017-02-16 NOTE — ICD/PM
No change to Med[employer] ICD for fem-pop bypass. Grounding pad on thigh, opposite side of ICD. Routine outpatient followup.   Ayana Weiner, NP

## 2017-02-20 PROBLEM — I70.249 ATHEROSCLEROSIS OF LEFT LOWER EXTREMITY WITH ULCERATION (HCC): Status: ACTIVE | Noted: 2017-01-01

## 2017-02-20 NOTE — CONSULTS
[facility]  Tessie [address] Patient Status:  Inpatient    1933 MRN [id number]   [facility] 6NE-A Attending Karthik Love MD   Hosp Day # 0 PCP Jose Verduzco DO     Reason for consult: medical management    Reque PATIENT DOCUMENTED NOT TO HAVE EXPERIENCED ANY OF THE FOLLOWING EVENTS  10/3/2012    Comment Procedure: CAUDAL;  Surgeon: Vandana Carnes MD;  Location: [facility] FOR PAIN MANAGEMENT    INJECTION, W/WO CONTRAST, DX/THERAPEUTIC SUBSTANCE, EPIDURAL/SUB Vanessa Kern NDL/CATH SPI DX/THER Chrissy Marin  6/19/2013    Comment Procedure: CAUDAL;  Surgeon: Jessenia Alexander MD;  Location: [facility] FOR PAIN MANAGEMENT    PATIENT [address] FOR IV ANTIBIOTIC SURGICAL SITE INFECTION PROPHYLAXIS.   6/19/2013    C Procedure: LUMBAR EPIDURAL;  Surgeon: Milo Watson MD;  Location: [address] [place] [address] FOR PAIN MANAGEMENT    PATIENT [address] [place] [address] FOR IV ANTIBIOTIC SURGICAL SITE INFECTION PROPHYLAXIS.   1/17/2014    Comment Procedure: LUMBAR EPIDURAL;  Surgeon: 7/9/2014    Comment Procedure: CAUDAL;  Surgeon: Nabor Astorga MD;  Location: [facility] FOR PAIN MANAGEMENT    INJECTION, W/WO CONTRAST, DX/THERAPEUTIC SUBSTANCE, EPIDURAL/SUBARACHNOID; LUMBAR/SACRAL N/A 7/23/2014    Comment Procedure: CAUDAL;  Abhay Rough SURGICAL SITE INFECTION PROPHYLAXIS.  N/A 9/25/2015    Comment Procedure: CAUDAL;  Surgeon: Shiva Garcia MD;  Location: [facility] FOR PAIN MANAGEMENT    PATIENT DOCUMENTED NOT TO HAVE EXPERIENCED ANY OF THE FOLLOWING EVENTS N/A 9/25/2015    Comment Pr He reports that he does not use illicit drugs.     Family History:   Family History   Problem Relation Age of Onset   • Cancer Father    • Cancer Maternal Grandmother    • Heart Disorder Brother    • Thyroid disease Mother      Thyroid issues in his mother atraumatic. Moist mucous membranes. EOM-I. Neck: No JVD. No carotid bruits. Respiratory: Clear to auscultation bilaterally. No wheezes. No rhonchi. Cardiovascular: Irregularly irregular   Chest and Back: No tenderness or deformity.   Abdomen: Soft, nont

## 2017-02-20 NOTE — ANESTHESIA PREPROCEDURE EVALUATION
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 Transvalvular     velocity was within the normal range. There was no stenosis. No     regurgitation. 3. Aortic root: The aortic root was dilated at the mid sinus level at 4.6     cm.  the ascending aorta above the stenosis. There was trivial regurgitation. Aortic valve:   Trileaflet; moderately thickened leaflets. Cusp separation  was normal.  Doppler:  Transvalvular velocity was within the normal range. There was no stenosis.  No regurgitation.   Tricuspid valve: ---------      Left atrium                                     Value        Reference   LA ID, A-P, ES, MM                      (H)     6.1   cm     3.0 - 4.0   LA ID/bsa, A-P, ES, MM                  (H)     3.2   cm/m^2 1. 5 - 2.3   LA/aortic root ratio, Lumbar spondylosis     Lumbar stenosis     Effusion of lower leg joint     Osteoarthrosis, unspecified whether generalized or localized, lower leg     Unspecified internal derangement of knee     Lumbosacral spondylosis without myelopathy     Degeneration EPIDURAL/SUBARACHNOID; LUMBAR/SACRAL  10/22/2012    Comment Procedure: CAUDAL;  Surgeon: Camryn Hernandez MD;  Location: [place] [address] [address] [place] NDL/CATH SPI DX/THER [address]  10/22/2012    Comment Procedure: CAUDAL;  Surgeon: Saray Cortes 6/19/2013    Comment Procedure: CAUDAL;  Surgeon: Leilani Stevens MD;  Location: [facility] FOR PAIN MANAGEMENT    PATIENT DOCUMENTED NOT TO HAVE EXPERIENCED ANY OF THE FOLLOWING EVENTS  6/19/2013    Comment Procedure: CAUDAL;  Surgeon: Leilani Stevens, Surgeon: Vandana Carnes MD;  Location: [facility] FOR PAIN MANAGEMENT    PATIENT DOCUMENTED NOT TO HAVE EXPERIENCED ANY OF THE FOLLOWING EVENTS  1/17/2014    Comment Procedure: LUMBAR EPIDURAL;  Surgeon: Vandana Carnes MD;  Location: [facility] Surgeon: Vandana Carnes MD;  Location: [address] [place] [place] NDL/CATH SPI DX/THER [address] N/A 7/23/2014    Comment Procedure: CAUDAL;  Surgeon: Vandana Carnes MD;  Location: [address] [place] [address] Procedure: CAUDAL;  Surgeon: Nathen Mckinnon MD;  Location: [facility] FOR PAIN MANAGEMENT    INJECTION, W/WO CONTRAST, DX/THERAPEUTIC SUBSTANCE, EPIDURAL/SUBARACHNOID; LUMBAR/SACRAL N/A 2/5/2016    Comment Procedure: CAUDAL;  Surgeon: Nathen Mckinnon, 33.8 02/20/2017   RDW 12.7 02/20/2017   .0* 02/20/2017       Lab Results  Component Value Date    01/31/2017   K 5.3* 01/31/2017    01/31/2017   CO2 30.0 01/31/2017   BUN 50* 01/31/2017   CREATSERUM 2.05* 01/31/2017   * 01/31/2017

## 2017-02-20 NOTE — CONSULTS
[facility]    Report of Consultation    Juan R Migdalia Patient Status:  Inpatient    1933 MRN [id number]   [facility] 6NE-A Attending Esme Viera MD   Hosp Day # 0 PCP Lzu Velasco DO     Date of Admission:  2017  D Comment Procedure: CAUDAL;  Surgeon: Leilani Stevens MD;  Location: [facility] FOR PAIN MANAGEMENT    PATIENT DOCUMENTED NOT TO HAVE EXPERIENCED ANY OF THE FOLLOWING EVENTS  10/3/2012    Comment Procedure: CAUDAL;  Surgeon: Leilani Stevens MD;  Lakshmi Loredo Procedure: CAUDAL;  Surgeon: Sajan Lakhani MD;  Location: [address] [facility] NDL/CATH SPI DX/THER Balaji Inman  6/19/2013    Comment Procedure: CAUDAL;  Surgeon: Sajan Lakhani MD;  Location: [address] [place] [address] Location: [place] [address] [address] [place] NDL/CATH SPI DX/THER NJX  1/17/2014    Comment Procedure: LUMBAR EPIDURAL;  Surgeon: Nicole Johnson MD;  Location: [facility] FOR PAIN MANAGEMENT    PATIENT [place] PREOPERATIVE ORDER FOR I [facility] FOR PAIN MANAGEMENT    PATIENT DOCUMENTED NOT TO HAVE EXPERIENCED ANY OF THE FOLLOWING EVENTS N/A 7/9/2014    Comment Procedure: CAUDAL;  Surgeon: Ashish Ellis MD;  Location: [facility] PAIN MANAGEMENT    INJECTION, W/WO CONTRAST, DX/TH MD;  Location: [facility] FOR PAIN MANAGEMENT    PATIENT [address] FOR IV ANTIBIOTIC SURGICAL SITE INFECTION PROPHYLAXIS.  N/A 9/25/2015    Comment Procedure: CAUDAL;  Surgeon: Herman Quinn MD;  Location: [address] [place] [address] Problem Relation Age of Onset   • Cancer Father    • Cancer Maternal Grandmother    • Heart Disorder Brother    • Thyroid disease Mother      Thyroid issues in his mother      reports that he quit smoking about 11 years ago.  He has never used smokeless t Q2H PRN **OR** HYDROmorphone HCl PF (DILAUDID) 1 MG/ML injection 0.8 mg, 0.8 mg, Intravenous, Q2H PRN **OR** HYDROmorphone HCl PF (DILAUDID) 1 MG/ML injection 1.2 mg, 1.2 mg, Intravenous, Q2H PRN  •  0.9%  NaCl infusion, , Intravenous, Continuous  •  [STAR joint     Osteoarthrosis, unspecified whether generalized or localized, lower leg     Unspecified internal derangement of knee     Lumbosacral spondylosis without myelopathy     Degeneration of lumbar or lumbosacral intervertebral disc     Lumbago     Recardo Lock

## 2017-02-20 NOTE — H&P
Swati Betts, [address] [facility]  [address]     VASCULAR SURGERY CONSULT NOTE      Name: Renetta Cee   :   1933  [id number]     REFERRING PHYSICIAN: No att. providers found  PRIMARY aneuyrisum     CATARACT      HIP REPLACEMENT SURGERY      INJECTION, W/WO CONTRAST, DX/THERAPEUTIC SUBSTANCE, EPIDURAL/SUBARACHNOID; LUMBAR/SACRAL  10/3/2012    Comment Procedure: CAUDAL;  Surgeon: Camryn Hernandez MD;  Location: [address] [place] [address] PAIN MANAGEMENT    PATIENT [place] PREOPERATIVE ORDER FOR IV ANTIBIOTIC SURGICAL SITE INFECTION PROPHYLAXIS.  1/29/2013    Comment Procedure: CAUDAL;  Surgeon: Tita Castorena MD;  Location: [facility] FOR PAIN MANAGEMENT    PATIENT DOCUMENTED NOT TO HA INJECTION, W/WO CONTRAST, DX/THERAPEUTIC SUBSTANCE, EPIDURAL/SUBARACHNOID; LUMBAR/SACRAL  9/12/2013    Comment Procedure: CAUDAL;  Surgeon: Soco Ashley MD;  Location: [facility] FOR PAIN MANAGEMENT    EPIDUROGRAPHY, RADIOLOGICAL S & I  9/12/2013 DX/THERAPEUTIC SUBSTANCE, EPIDURAL/SUBARACHNOID; LUMBAR/SACRAL N/A 7/9/2014    Comment Procedure: CAUDAL;  Surgeon: Alana Tolbert MD;  Location: [place] [address] [address] NDL/CATH SPI DX/THER [address] N/A 7/9/2014    Comment [place] ANTIBIOTIC SURGICAL SITE INFECTION PROPHYLAXIS.  N/A 12/16/2014    Comment Procedure: CAUDAL;  Surgeon: Rocio Alonso MD;  Location: [facility] PAIN MANAGEMENT    PATIENT DOCUMENTED NOT TO HAVE EXPERIENCED ANY OF THE FOLLOWING EVENTS N/A 12/16/2014 Emmie Runner, MD;  Location: [address] [place] [address]    PATIENT [address] [place] [address] FOR IV ANTIBIOTIC SURGICAL SITE INFECTION PROPHYLAXIS.  N/A 6/14/2016    Comment Procedure: CAUDAL;  Surgeon: Gregg Callejas MD;  Location: [address] [place] [address] monophasic signal  monophasic signal  monophasic signal  none      Has superficial ulceration along the heel, 3rd and 5th toes with no tendon or bone exposure                 Diagnostic Data:      LABS:  Recent Labs   Lab  02/20/17   0630   WBC  7.5   HGB diffuse calcified disease   throughout the femoral and infrapopliteal arteries with flow where   visualized. - Left: Moderate arterial disease with monophasic wrist waveforms and an LADARIUS   of 0.50. The absolute second digit pressure is 14mmHg.  Duplex imagin pressures may be unreliable due to arterial calcification or the presence of extensive pedal or digital disease.  The accuracy of toe pressures may be affected by several technical and environmental variables and these results should be interpreted in light inflow disease and chronic occlusion of the proximal   superficial femoral artery. ---------------------------------------------------------------------------- Findings: Right common femoral:  Moderate diffuse disease.   Distal vessel lesion: There is a 50- superficial femoral - mid      !-7.5cm/s ! +------------------------------------+---------+ ! Left superficial femoral - distal   !-13cm/s  ! +------------------------------------+---------+ ! Left popliteal - proximal           !14.7cm/s ! +---------------- Phone: [phone]                                   Fax: [phone] ---------------------------------------------------------------------------- Vein Mapping Patient: Aren Pretty Date: Feb 10 2017 4:15PM :     1933 (83 !2.60mm! +----+---------------------+------+ ! SSV ! Right popliteal fossa! 2.40mm! +----+---------------------+------+ ! SSV ! Proximal right calf  ! 2.00mm! +----+---------------------+------+ ! SSV ! Mid right calf       ! 2.70mm!  +----+---------------------+--- 8/05/2015, 17:32. INDICATIONS:  pre vascular surgery  FINDINGS:  There is a dual-lead left-sided permanent AICD pacemaker with leads over the right atrium and right ventricle.  There is stable mild cardiomegaly and a calcified, tortuous, ectatic thoracic a

## 2017-02-20 NOTE — ANESTHESIA POSTPROCEDURE EVALUATION
Cameron Dorado Patient Status:  Inpatient   Age/Gender 80year old male MRN [id number]   [facility] 6NE-A Attending Jennifer Burton MD   Hosp Day # 0 PCP Kwadwo Red DO       Anesthesia Post-op Note    Procedure(s):  left

## 2017-02-20 NOTE — PLAN OF CARE
Patient and wife stated that Matthew De La O has a dnr form at home and originally he stated he did not want cpr. He then changed his mind and  told me he wants everything done for now and he does want cpr for this hospitalization.  His wife is at bedside and she agre

## 2017-02-20 NOTE — BRIEF OP NOTE
Patients Name: Shemar Haney  Attending Physician: Day Joy MD  Operating Physician: Ernesto Carl MD  CSN: [id number]     Location:  OR  MRN: [id number]    YOB: 1933  Admission Date: 2/20/2017  Operation Date: 2/20/2017    Brief Op

## 2017-02-20 NOTE — HISTORICAL OFFICE NOTE
[place]  : 1933  ACCOUNT:  [id number]  [phone]  PCP: Dr. Ariel Laureano     TODAY'S DATE: 02/15/2017  DICTATED BY:  Berwyn Haines Frommelt, APN]    CHIEF COMPLAINT: [Cardiac Clearance and Hospital Discharge.]    HPI: [On 02/15/2017, Richardson Conner an CV: rest pain LLE, denies chest pain. RESP: denies chronic cough, hemoptysis. GI: denies melena, hematochezia. : no hematuria. INTEG: no new rashes, lesions. MS: back pain, limiting arthritis and really bad lately. NEURO: no localized deficits.  HEM/LYMPH on left. EXT: no peripheral edema. ASSESSMENT:  1. . CAD, of native vessels  2. Pre-operative evaluation  3. Abnormal EKG  4. Aneurysm, abdominal/s/p repair  5. Cardiac arrest, cause unspecified  6. Carotid artery stenosis, bilateral  7.  Coumadin Manag *Lisinopril-Hydrochlor10-12.5M  1 TABLET TWICE DAILY FOR BLOOD PRESS     10/05/16 *Digoxin              125MCG    1 TABLET DAILY.                          09/16/16 *Warfarin Sodium      2MG       1 TO 2 TABLETS BY MOUTH EVERY DAY AS     03/14/16 *AmLODIPin denies melena, hematochezia. : no hematuria. INTEG: no new rashes, lesions. MS: back pain, limiting arthritis and really bad lately. NEURO: no localized deficits. HEM/LYMPH: denies easy bruising. ALL: no new food or environmental allergies.     PAST HISTO rhythm; soft aortic sclerotic murmur. CAROTIDS: carotid pulses normal. ABD AORTA: deferred. FEM: deferred. PEDAL: deferred. EXT: no peripheral edema. DECISION MAKING: An 68-year-old gentleman with compensated ischemic cardiomyopathy.   Associated periphe MOUTH EVERY EVENING AT B  09/09/14 *Warfarin Sodium      2MG       1 TABLET BY MOUTH EVERY DAY AS [facility]  08/26/15 Symbicort             160-4.5M  Once daily, unsure of strength  09/09/14 Hydrocodone-Acetaminop10-325MG  1 TABLET EVERY 4 TO 6 HOURS AS NEEDE

## 2017-02-21 NOTE — PROGRESS NOTES
[facility]  Vascular Surgery Progress Note    Nadyne [place] Patient Status:  Inpatient    1933 MRN [id number]   [facility] 6NE-A Attending Anabel Hair MD   Hosp Day # 1 PCP Bouchra Franco DO     Objective:   Temp: 97.8 °F (36 atrial fibrillation (HCC)     Benign essential HTN        Medications:    Current Facility-Administered Medications:  folic acid (FOLVITE) tab 1 mg 1 mg Oral Daily   AmLODIPine Besylate (NORVASC) tab 5 mg 5 mg Oral Daily   carvedilol (COREG) tab 25 mg 25 m MD  2/21/2017  9:20 AM

## 2017-02-21 NOTE — PLAN OF CARE
Patient alert and oriented, converses appropriately, wife at bedside. Left foot warm, pulses by doppler, dressings intact with small amount of drainage to lower dressing.  Patient is tolerating PO well, urine per catheter, pain is controlled with PO Norco.

## 2017-02-21 NOTE — PROGRESS NOTES
Seen and examined. Up in chair eating breakfast. Feels well. Nursing notes regarding apnea reviewed.     Afebrile  139/68    Tele primarily paced    Lungs hyperinflated but clear  Ht RRR at present  abd soft  Ext left foot warm -- good coloring    42/1.2

## 2017-02-21 NOTE — PHYSICAL THERAPY NOTE
PHYSICAL THERAPY EVALUATION - INPATIENT     Room Number: 6809/8475-L  Evaluation Date: 2/21/2017  Type of Evaluation: Initial  Physician Order: PT Eval and Treat    Presenting Problem: left femoral-popliteal bypass 2/20/17  Reason for Therapy: Mobility THE FOLLOWING EVENTS  10/3/2012    Comment Procedure: CAUDAL;  Surgeon: Sajan Lakhani MD;  Location: [facility] FOR PAIN MANAGEMENT    INJECTION, W/WO CONTRAST, DX/THERAPEUTIC SUBSTANCE, EPIDURAL/SUBARACHNOID; LUMBAR/SACRAL  10/22/2012    Comment Proce Procedure: CAUDAL;  Surgeon: Lina Loredo MD;  Location: [facility] FOR PAIN MANAGEMENT    PATIENT [address] FOR IV ANTIBIOTIC SURGICAL SITE INFECTION PROPHYLAXIS.   6/19/2013    Comment Procedure: CAUDAL;  Surgeon: Lina Loredo, Location: [facility] FOR [facility]IN [facility]NAGEMENT    PATIENT [address] [place] [address] FOR IV ANTIBIOTIC SURGICAL SITE INFECTION PROPHYLAXIS.   1/17/2014    Comment Procedure: LUMBAR EPIDURAL;  Surgeon: Ashish Ellis MD;  Location: [address] [place] [address] Ankit Laurent MD;  Location: [address] [place] [address]    INJECTION, W/WO CONTRAST, DX/THERAPEUTIC SUBSTANCE, EPIDURAL/SUBARACHNOID; LUMBAR/SACRAL N/A 7/23/2014    Comment Procedure: CAUDAL;  Surgeon: Katia Benítez MD;  Location: [address] [place] [address] Procedure: CAUDAL;  Surgeon: Luis Alberto Basurto MD;  Location: [facility] FOR PAIN MANAGEMENT    PATIENT DOCUMENTED NOT TO HAVE EXPERIENCED ANY OF THE FOLLOWING EVENTS N/A 9/25/2015    Comment Procedure: CAUDAL;  Surgeon: Luis Alberto Basurto MD;  Location:  Rolling walker;Cane;Other (Comment) (electric wheelchair)  Patient Regularly Uses: Glasses    Prior Level of [place]: Ambulates short distances in the house with cane. In community short distances with rolling walker.  Electric wheelchair when [employer] -   Moving to and from a bed to a chair (including a wheelchair)?: A Lot   -   Need to walk in hospital room?: A Lot   -   Climbing 3-5 steps with a railing?: Total       AM-PAC Score:  Raw Score: 13   PT Approx Degree of Impairment Score: 64.91%   Stand mobility. Needs encouragement to increase activity level. DISCHARGE RECOMMENDATIONS  PT Discharge Recommendations: Sub-acute rehabilitation (ELOS 11 to 14 days)    PLAN  PT Treatment Plan: Bed mobility; Energy conservation;Gait training;Stair training

## 2017-02-21 NOTE — OPERATIVE REPORT
[facility]    PATIENT'S NAME: [address]   ATTENDING PHYSICIAN: Swati Golden M.D. OPERATING PHYSICIAN: Swati Golden M.D.    PATIENT ACCOUNT#:   [de-identified]    LOCATION:  [facility] 1 EDW 10  MEDICAL RECORD #:   [id number]       DATE OF VIANEY difficult. PROCEDURE:  The patient was brought to the catheterization lab and laid supine on the table. After induction of sedation, both groins were prepped and draped in the usual surgical sterile fashion.   A micropuncture needle was then used to a different angles and was able to image the majority of the peroneal artery and determined that the patient will benefit from a femoral to below-knee popliteal artery or perhaps the peroneal artery depending on the calcification present.   At this point, bot

## 2017-02-21 NOTE — CONSULTS
[facility]  Report of Inpatient Wound Care Consultation     Tim Hendrix Ramon Patient Status:  Inpatient    1933 MRN [id number]   [facility] 6NE-A Attending Trixie Lerma MD   Hosp Day # 1 PCP Jay Schilling,      REASON FOR CON SITE INFECTION PROPHYLAXIS.   10/3/2012    Comment Procedure: CAUDAL;  Surgeon: Nabor Astorga MD;  Location: [facility] FOR PAIN MANAGEMENT    PATIENT DOCUMENTED NOT TO HAVE EXPERIENCED ANY OF THE FOLLOWING EVENTS  10/3/2012    Comment Procedure: Derek Pondera LUMBAR/SACRAL  6/19/2013    Comment Procedure: CAUDAL;  Surgeon: Charyl Hatchet, MD;  Location: [address] NDL/CATH SPI DX/THER [address] Meh[address] Rodriguez 84  6/19/2013    Comment Procedure: CAUDAL;  Surgeon: Charyl Hatchet, MD;  Gustavo Campbell Surgeon: Alana Tolbert MD;  Location: [address] [place] [address] NDL/CATH SPI DX/[facility]  1/17/2014    Comment Procedure: LUMBAR EPIDURAL;  Surgeon: Alana Tolbert MD;  Location: [address] [place] [address] MANAGEMENT    PATIENT W Bala Riggins MD;  Location: [facility] FOR PAIN MANAGEMENT    PATIENT DOCUMENTED NOT TO HAVE EXPERIENCED ANY OF THE FOLLOWING EVENTS N/A 7/9/2014    Comment Procedure: CAUDAL;  Surgeon: Bala Riggins MD;  Location: [address] [place] [address] CAUDAL;  Surgeon: Nathen Mckinnon MD;  Location: [facility] FOR PAIN MANAGEMENT    PATIENT [address] FOR IV ANTIBIOTIC SURGICAL SITE INFECTION PROPHYLAXIS.  N/A 9/25/2015    Comment Procedure: CAUDAL;  Surgeon: Nathen Mckinnon MD;  [employer] Comment right       Smoking Status: Former Smoker                   Packs/Day: 0.00  Years: 36        Quit date: 01/01/2006    Smokeless Status: Never Used                        Alcohol Use: Yes                Comment: 3-4 glasses of wine everyday      Al [address] [place] [address] Rd  [phone]  Spectralink: [phone]  Pager: [phone]  VM: [phone]

## 2017-02-21 NOTE — CM/SW NOTE
02/21/17 1300   CM/SW Referral Data   Referral Source Physician   Reason for Referral Discharge planning   Informant Patient   Social History   Recreational Drug/Alcohol Use no   Major Changes Last 6 Months no   Domestic/Partner Violence no   Suicidal I

## 2017-02-21 NOTE — DIETARY MALNUTRITION NOTE
NUTRITION INITIAL ASSESSMENT    Pt is at high nutrition risk. Pt meets malnutrition criteria.     NUTRITION DIAGNOSIS/PROBLEM:    Malnutrition related to inability to consume sufficient energy as evidenced by pt reports poor po/appetite past 3-4 weeks, pt w Yes    NUTRITION RELATED PHYSICAL FINDINGS:     1. Body Fat/Muscle Mass: BMI: 27.09     2.  Fluid Accumulation: none noted    NUTRITION PRESCRIPTION: based on CBW 76.1 kg  Calories: 3841-7512 calories/day (23-27 calories per kg)  Protein:  grams prote

## 2017-02-21 NOTE — PROGRESS NOTES
[facility]PITAL[facility]  Progress Note     Radha Martinez Patient Status:  Inpatient    1933 MRN [id number]   [facility] 6NE-A Attending Evangelina Diallo MD   Hosp Day # 1 PCP Joseluis Lin DO       S: Patient seen and examined.  Feeling wel 40 mg Subcutaneous Nightly   • lisinopril-hydrochlorothiazide (ZESTORETIC 10/12. 5) combination tablet   Oral Daily   • Warfarin Sodium  2 mg Oral Nightly       ASSESSMENT / PLAN:     1. PVD s/p left femoral-distal popliteal bypass  1.  Management per vascul

## 2017-02-21 NOTE — PROGRESS NOTES
Patient with history of sleep apnea. He was informed that his oxygen saturation had dropped to as low as 58% and should be >90%. He was boosted in bed, the head of bed was raised, and 3L n/c was applied.  He continued to have periods of apnea with saturatio

## 2017-02-21 NOTE — OCCUPATIONAL THERAPY NOTE
OCCUPATIONAL THERAPY EVALUATION - INPATIENT     Room Number: 7941/7515-L  Evaluation Date: 2/21/2017  Type of Evaluation: Initial  Presenting Problem: Fem Pop Bypass    Physician Order: IP Consult to Occupational Therapy  Reason for Therapy: ADL/IADL Dysfu MD;  Location: [facility] FOR PAIN MANAGEMENT    PATIENT DOCUMENTED NOT TO HAVE EXPERIENCED ANY OF THE FOLLOWING EVENTS  10/3/2012    Comment Procedure: CAUDAL;  Surgeon: Katia Benítez MD;  Location: [facility] PAIN MANAGEMENT    INJECTION, W/WO CO [address] NDL/CATH SPI DX/THER [place]  6/19/2013    Comment Procedure: CAUDAL;  Surgeon: Pablito Ng MD;  Location: [address] [place] [address]    PATIENT [address] [place] [address] FOR IV ANTIBIOTIC [place] [address] NDL/CATH SPI DX/THER [place]  1/17/2014    Comment Procedure: LUMBAR EPIDURAL;  Surgeon: Pratik Booker MD;  Location: [facility] FOR PAIN MANAGEMENT    PATIENT [address] FOR IV ANTIBIOTIC SURGICAL SITE INFECTION PROPHYLAXIS.   1/17/2014 EXPERIENCED ANY OF THE FOLLOWING EVENTS N/A 7/9/2014    Comment Procedure: CAUDAL;  Surgeon: Sajan Lakhani MD;  Location: [facility] FOR PAIN MANAGEMENT    INJECTION, W/WO CONTRAST, DX/THERAPEUTIC SUBSTANCE, EPIDURAL/SUBARACHNOID; LUMBAR/SACRAL N/A 7/2 PREOPERATIVE ORDER FOR IV ANTIBIOTIC SURGICAL SITE INFECTION PROPHYLAXIS.  N/A 9/25/2015    Comment Procedure: CAUDAL;  Surgeon: Leilani Stevens MD;  Location: [place] [address]. 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 : A Lot  -   Putting on and taking off regular upper body clothing?: A Lot  -   Taking care of personal grooming such as brushing teeth?: A Little  -   Eating meals?: None    AM-PAC Score:  Score: 15  Approx Degree of Impairment: (Obs): 5x/week  Number of Visits to Meet Established Goals: 5    ADL Goals  Patient will perform all ADLs: with supervision    Functional Transfer Goals  Patient will perform all functional transfers:  with supervision    UE Exercise Program Goal  Patient

## 2017-02-22 NOTE — PLAN OF CARE
Assumed pt care at 0730. During report, RN noticed blood on the sheets. Pt's leg dressing saturated with blood. This RN took down the dressing and noted that site is not bleeding anymore. Pt up to the chair. VSS. Will continue to monitor.

## 2017-02-22 NOTE — PROGRESS NOTES
[facility]PITAL[facility]  Progress Note     Jalen Novoa Patient Status:  Inpatient    1933 MRN [id number]   [facility] 6NE-A Attending Elizabeth Okeefe MD   Hosp Day # 2 PCP Emilee Monday,        S: Patient seen and examined.  No complain mcg Oral Daily   • aspirin  325 mg Oral Daily   • aspirin  300 mg Rectal Daily   • enoxaparin  40 mg Subcutaneous Nightly   • lisinopril-hydrochlorothiazide (ZESTORETIC 10/12. 5) combination tablet   Oral Daily   • Warfarin Sodium  2 mg Oral Nightly       A

## 2017-02-22 NOTE — OCCUPATIONAL THERAPY NOTE
OCCUPATIONAL THERAPY TREATMENT NOTE - INPATIENT     Room Number: 8836/1969-Y  Session: 1   Number of Visits to Meet Established Goals: 5    Presenting Problem: Fem Pop Bypass    History related to current admission: t is admit s/p Fem Pop Bypass for non-he EVENTS  10/3/2012    Comment Procedure: CAUDAL;  Surgeon: Tita Castorena MD;  Location: [facility] FOR PAIN MANAGEMENT    INJECTION, W/WO CONTRAST, DX/THERAPEUTIC SUBSTANCE, EPIDURAL/SUBARACHNOID; LUMBAR/SACRAL  10/22/2012    Comment Procedure: CAUDAL; CAUDAL;  Surgeon: Jaci Mandel MD;  Location: [facility] FOR PAIN MANAGEMENT    PATIENT [address] FOR IV ANTIBIOTIC SURGICAL SITE INFECTION PROPHYLAXIS.   6/19/2013    Comment Procedure: CAUDAL;  Surgeon: Jaci Mandel MD;  Souleymane Bocanegra [facility] FOR PAIN MANAGEMENT    PATIENT [place] PREOPERATIVE ORDER FOR IV ANTIBIOTIC SURGICAL SITE INFECTION PROPHYLAXIS.   1/17/2014    Comment Procedure: LUMBAR EPIDURAL;  Surgeon: Juju Curry MD;  Location: [facility] FOR PAIN MANAGEMENT    [place] Location: [facility] FOR PAIN MANAGEMENT    INJECTION, W/WO CONTRAST, DX/THERAPEUTIC SUBSTANCE, EPIDURAL/SUBARACHNOID; LUMBAR/SACRAL N/A 7/23/2014    Comment Procedure: CAUDAL;  Surgeon: Astrid Maki MD;  Location: [address] [place] [address] CAUDAL;  Surgeon: Meredith Aj MD;  Location: [facility] FOR PAIN MANAGEMENT    PATIENT DOCUMENTED NOT TO HAVE EXPERIENCED ANY OF THE FOLLOWING EVENTS N/A 9/25/2015    Comment Procedure: CAUDAL;  Surgeon: Meredith Aj MD;  Location: [address] [place] [address] patient)    WEIGHT BEARING RESTRICTION  Weight Bearing Restriction: None                PAIN ASSESSMENT  Ratin  Location: no pain at this time  Management Techniques: Activity promotion; Body mechanics;Breathing techniques;Relaxation;Repositioning     A of adaptive techniques. At this time pt needs mod assist with mobility and complex self-care. This is below PLOF of being fully modified independent. Cuing to adjust posture and reminders for pacing needed during mobility.   Subacute rehab is recommended

## 2017-02-22 NOTE — PHYSICAL THERAPY NOTE
PHYSICAL THERAPY TREATMENT NOTE - INPATIENT    Room Number: 4872/5789-L     Session: 1  Number of Visits to Meet Established Goals: 5    Presenting Problem: left femoral-popliteal bypass 2/20/17    Problem List  Active Problems:    Atherosclerosis of left Location: [facility] FOR PAIN MANAGEMENT    INJECTION, W/WO CONTRAST, DX/THERAPEUTIC SUBSTANCE, EPIDURAL/SUBARACHNOID; LUMBAR/SACRAL  10/22/2012    Comment Procedure: CAUDAL;  Surgeon: Mary Brock MD;  Location: [place] [address] PATIENT [facility] PREOPERATIVE ORDER FOR IV ANTIBIOTIC SURGICAL SITE INFECTION PROPHYLAXIS.   6/19/2013    Comment Procedure: CAUDAL;  Surgeon: Luis Alberto Basurto MD;  Location: [facility] FOR PAIN MANAGEMENT    PATIENT DOCUMENTED NOT TO HAVE EXPERIENCED A ANTIBIOTIC SURGICAL SITE INFECTION PROPHYLAXIS.   1/17/2014    Comment Procedure: LUMBAR EPIDURAL;  Surgeon: Judi Suh MD;  Location: [facility] FOR PAIN MANAGEMENT    PATIENT DOCUMENTED NOT TO HAVE EXPERIENCED ANY OF THE FOLLOWING EVENTS  1/17/2014 DX/THERAPEUTIC SUBSTANCE, EPIDURAL/SUBARACHNOID; LUMBAR/SACRAL N/A 7/23/2014    Comment Procedure: CAUDAL;  Surgeon: Prem Kong MD;  Location: [place] [address] [address] NDL/CATH SPI DX/THER [address] N/A 7/23/2014    Comment Pro MANAGEMENT    PATIENT DOCUMENTED NOT TO HAVE EXPERIENCED ANY OF THE FOLLOWING EVENTS N/A 9/25/2015    Comment Procedure: CAUDAL;  Surgeon: Mitchell Cornelius MD;  Location: [facility] FOR PAIN MANAGEMENT    INJECTION, W/WO CONTRAST, DX/THERAPEUTIC SUBSTANCE ASSESSMENT   Ratin  Location: left leg  Management Techniques: Activity promotion; Other (Comment)    BALANCE                                                                                                                     Static Sitting: Fair +  Dyn left with call light in reach. RN aware.      THERAPEUTIC EXERCISES  Lower Extremity Alternating marching  Ankle pumps  Hip AB/AD  LAQ     Upper Extremity      Position Sitting     Repetitions   10   Sets   1     Patient End of Session: Up in chair;Needs me

## 2017-02-22 NOTE — CM/SW NOTE
F/U with recommendations. Wife at bedside. Pt is in agreement of going and wife was worried about taking him home. [facility] has been at [facility].  They are in agreement with referral.   Nursing home prescreen requested  Ecin referral     Tar

## 2017-02-22 NOTE — PROGRESS NOTES
[facility]   Cardiology Progress Note     Subjective:   Some pain in left leg, denies dyspnea.      Objective:   /66 mmHg  Pulse 63  Temp(Src) 98.3 °F (36.8 °C) (Temporal)  Resp 19  Ht 5' 6\" (1.676 m)  Wt 167 lb 1.7 oz (75.8 kg)  BMI 26.98 kg/m2 IVET Ball   SpectraLink- [id number]  2/22/2017  10:03 AM    Seen and examined earlier today. Doing very well. Had some oozing from the calf incision -- treated by Dr. Gurdeep Stone with a few additional stiches. Dressing clean and dry now. No cardiopulmonary issues.

## 2017-02-22 NOTE — PROGRESS NOTES
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

## 2017-02-22 NOTE — PLAN OF CARE
Patient alert and oriented x4, converses appropriately, denies pain at this time. CSM to LLE intact, Pedal pulse palpable. 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 Surgical dressings intact with old dry drainage.  Voiding per urinal

## 2017-02-23 NOTE — PLAN OF CARE
Patient alert and oriented, converses appropriately. CSM to LLE intact, pain controlled with ordered pain medications. Tolerating PO well, voiding per urinal without difficulty, up with walker and boot.  LLE surgical dressing with small light pink and yello

## 2017-02-23 NOTE — CM/SW NOTE
Pt has been accepted at [facility]. Await medical clearance for dc.   Bridget Cummings, 02/23/2017, 8:42 AM

## 2017-02-23 NOTE — PLAN OF CARE
Assumed pt care at 0730. VSS. Pt on RA, refuses to wear oxygen with very poor ALAN. Left leg dressing changed, minimal old drainage noted from last night. Plan for discharge to rehab today.

## 2017-02-23 NOTE — PROGRESS NOTES
[facility]   Cardiology Progress Note     Subjective:   Some pain this am however much improved with pain meds. No cp or sob.  Lying flat comfortably    Objective:   /62 mmHg  Pulse 69  Temp(Src) 97.9 °F (36.6 °C) (Temporal)  Resp 19  Ht 5' 6\" ( year- compensated by exam  · ICD   · ALAN- refuses cpap. Pleasantly declined my attempts at trying to revisit the issue    Plan:     Plan for dc to [facility] for rehab today. redose coumadin. INR Monday.  Results to [facility] coumadin clinic (spoke with Brady Mendoza)

## 2017-02-23 NOTE — CM/SW NOTE
[facility] . Spoke w/ wife and she is on her way. She will transport pt to rehab.    Updated RN  Caren Ford RN/CM  [facility]  [phone]

## 2017-02-23 NOTE — H&P
Swati Knight, [address] [facility]  Division of Vascular and Endovascular Surgery  Wound Care Clinic    VASCULAR SURGERY  H&P      Name: Radha Martinez   : 1933  [id number]     REFERRING PHYSICIAN: No att. providers found  PRIMARY CARE PHYSI Comment  abdominal aneuyrisum     CATARACT       HIP REPLACEMENT SURGERY       INJECTION, W/WO CONTRAST, DX/THERAPEUTIC SUBSTANCE, EPIDURAL/SUBARACHNOID; LUMBAR/SACRAL   10/3/2012     Comment  Procedure: CAUDAL; Surgeon: Nicole Johnson MD; [place] [facility] Delmi Chapman [place]; Location: [facility] FOR PAIN MANAGEMENT     PATIENT [address] FOR IV ANTIBIOTIC SURGICAL SITE INFECTION PROPHYLAXIS.   1/29/2013     Comment  Procedure: CAUDAL; Surgeon: Delmi Chapman MD; Location: [address] [place] [address] Surgeon: Mitchell Cornelius MD; Location: [facility] FOR PAIN MANAGEMENT     INJECTION, W/WO CONTRAST, DX/THERAPEUTIC SUBSTANCE, EPIDURAL/SUBARACHNOID; LUMBAR/SACRAL   9/12/2013     Comment  Procedure: CAUDAL; Surgeon: Joao Hillman MD; Location: [facility] Delmi Chapman MD; Location: [facility] FOR PAIN MANAGEMENT     INJECTION, W/WO CONTRAST, DX/THERAPEUTIC SUBSTANCE, EPIDURAL/SUBARACHNOID; LUMBAR/SACRAL  N/A  7/9/2014     Comment  Procedure: CAUDAL; Surgeon: Delmi Chapman MD; Location: [address] [place] [address] CAUDAL; Surgeon: Luis Alberto Basurto MD; Location: [facility] FOR PAIN MANAGEMENT     PATIENT [address] FOR IV ANTIBIOTIC SURGICAL SITE INFECTION PROPHYLAXIS.   N/A  12/16/2014     Comment  Procedure: CAUDAL; Surgeon: Luis Alberto Basurto MD; L Romel Jacobo MD; Location: [facility] FOR PAIN MANAGEMENT     EPIDUROGRAPHY, RADIOLOGICAL S & I  N/A  6/14/2016     Comment  Procedure: CAUDAL; Surgeon: Romel Jacobo MD; Location: [address] [place] [address] [address] tenderness  VASCULAR:    Femoral   Popliteal   DP   PT   Peroneal   Edema     Right   1+   non-palpable   monophasic signal   monophasic signal   monophasic signal   none     Left   1+   non-palpable   monophasic signal   monophasic signal   monophasic sig calcified disease throughout the femoral and infrapopliteal arteries with flow where visualized. - Left: Moderate arterial disease with monophasic wrist waveforms and an LADARIUS of 0.50. The absolute second digit pressure is 14mmHg.  Duplex imaging demonstrates presence of extensive pedal or digital disease. The accuracy of toe pressures may be affected by several technical and environmental variables and these results should be interpreted in light of the patient&apos;s complete clinical record.  Electronically s stenotic velocity 331cm/sec; post-stenotic velocity 202cm/sec. Right superficial femoral: Mild diffuse disease. Left superficial femoral: Proximal vessel lesion: There is a 100%occlusion.  Tables: Arterial flow: +------------------------------------+------- +------------------------------------+---------+ ! Left dorsalis pedis - proximal !17.3cm/s ! +------------------------------------+---------+ ! Left posterior tibial - mid !13.2cm/s ! +------------------------------------+---------+ ! Left peroneal - distal ---------------------------------------------------------------------------- Impressions The bilateral greater and small saphenous veins were mapped with duplex imaging.  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 +----+---------------------+------+ ! GSV ! Left mid calf ! 3.00mm! +----+---------------------+------+ ! GSV ! Left distal calf ! 2.00mm! +----+---------------------+------+ ! GSV ! Left ankle ! 2.40mm! +----+---------------------+------+ ! SSV ! Left limb-threatened situation and will benefit from revascularization. The plan is for a L fem-below-knee bypass with possible reconstruction of the profunda anastomosis.  We discussed the risks of MI, need for transfusion, bleeding, wound complications, nerve

## 2017-02-27 NOTE — TELEPHONE ENCOUNTER
Patient would like to know if he should come in after he gets out of the rehab facility or come for his appointment tomorrow. Please advise.

## 2017-02-28 NOTE — OPERATIVE REPORT
[address]    PATIENT'S NAME: [address]   ATTENDING PHYSICIAN: Swati Rodríguez M.D. OPERATING PHYSICIAN: Swati Rodríguez M.D.    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 The patient understood and signed informed consent and expressed wish to proceed. PROCEDURE:  The patient was brought to the operating room and laid supine on the table.   After ind device was then used to perform a subsartorial tunnel between the below-knee popliteal fossa and the left groin wound, or make an incision, and through that, a tunnel 6 x 80 ringed Propaten PTFE graft was then passed.   The tunneling device was removed, and area and the calf area were irrigated copiously with antibiotic saline solution, and the groin wound was then closed in 2 layers using 2-0 Vicryl layer in a running fashion for the fascia layer followed by 3-0 Vicryl for the subcutaneous layer.   Then both

## 2017-03-01 NOTE — DISCHARGE SUMMARY
[facility]  Discharge Summary    Herman Navarro Patient Status:  Inpatient    1933 MRN [id number]   [facility] 6NE-A Attending No att. providers found   Hosp Day # 3 PCP Emilee      Date of Admission: 2017    Date of none    Disposition: SNF    Discharge Condition: Stable    Discharge Medications: Discharge Medication List as of 2/23/2017  6:48 PM    CONTINUE these medications which have CHANGED    Lisinopril-Hydrochlorothiazide 10-12.5 MG Oral Tab  1 tablet by mouth t

## 2017-03-13 NOTE — PROGRESS NOTES
Progress Note Details  Patient Name: Janis Garrison  Date: 3/10/2017   Patient Number: [id number] Physician / Gladys Loving: [place] Richard   Patient YOB: 1933 Facility: [facility]    Chief Complaint  This information was obtained 3/10/2017: Patient doing well. Underwent Left femoral to below-knee popliteal artery bypass using a 6 x 80 Propaten graft. The inflow was the left limb of the aortobifemoral bypass on 2/28/17 by Dr José Luis Sanchez. He has been recuperating at home with Patrick Kapoor.  Heel ul Integumentary (Hair/Skin/Nails): Prone to Skin Tears (on coumadin), Change: Hair, Nails, Skin (No hair growth to lower legs, thin skin, hemosiderin staining), Lumps (left shin location of mike), Ulcers (arterial ulcers to left heel and toes)    Patient julio cesar Sitting or standing Blood Pressure < 130/86. Pulse regular . Respirations regular, non-labored and within the target range. Temperature normal and within the target range . Well developed, well nourished with pleasant effect. In no apparent distress. Trino Kong The periwound skin exhibited: Dry/Scaly.  The periwound skin did not exhibit: Brawny Induration, Edema, Excoriation, Induration, Callus, Crepitus, Fluctuance, Friable, Rash, Moist, Maceration, Atrophie Mara, Cyanosis, Ecchymosis, Erythema, Hemosiderosis, (Encounter Diagnosis) I70.245 - Atherosclerosis of native arteries of left leg with ulceration of other part of foot  (Encounter Diagnosis) I48.2 - Chronic atrial fibrillation  (Encounter Diagnosis) I10 - Essential (primary) hypertension    Diagnoses    IC Other: - With Mirna AGUILA on the 21th (afternoon) in the wound care center. Schedule at the same time Dr Suzanne Knight is in the wound care center.         Electronic Signature(s)   Signed By:  Date:    Holden Boggs MSN, FNP-BC, CWON, [facility] 03/13/2017 14:49:03

## 2017-03-22 NOTE — PROGRESS NOTES
Progress Note Details  Patient Name: Fatmata Stevens  Date: 3/21/2017   Patient Number: [id number] Physician / Extender: Debra Lubin   Patient YOB: 1933 Facility: [facility]    Chief Complaint  This information was obtained 3/10/2017: Patient doing well. Underwent Left femoral to below-knee popliteal artery bypass using a 6 x 80 Propaten graft. The inflow was the left limb of the aortobifemoral bypass on 2/28/17 by Dr Tammi Baker. He has been recuperating at home with Patrick Kapoor.  Heel ul Constitutional Symptoms (General Health): Fever, Chills  Respiratory: Cough, Shortness of Breath  Cardiovascular (Central/Peripheral): Chest Pain, Intermittent Claudication, Lower extremity (leg) resting pain    Weekly Nutrition Assessment    Weekly Nutrit The periwound skin exhibited: Moist, Maceration.  The periwound skin did not exhibit: Brawny Induration, Edema, Excoriation, Induration, Callus, Crepitus, Fluctuance, Friable, Rash, Dry/Scaly, Atrophie Mara, Cyanosis, Ecchymosis, Erythema, Hemosiderosis, (Encounter Diagnosis) I48.2 - Chronic atrial fibrillation  (Encounter Diagnosis) I10 - Essential (primary) hypertension    Diagnoses    ICD-10  I70.222: Atherosclerosis of native arteries of extremities with rest pain, left leg  I70.244: Atherosclerosis of Initial Anesthetic Order: Apply lidocaine to wound bed on all future wound center visits during preparation for physician exam if wound bed contains fibrin or eschar.   4% Topical Lidocaine  Dermaplast Spray    Wound Cleansing & Dressings  May shower with p

## 2017-03-23 NOTE — TELEPHONE ENCOUNTER
LOV: 1/20/17 for foot ulcer    HYDROcodone-acetaminophen 5-325 MG Oral Tab          Sig :  Take 1 tablet by mouth every 6 (six) hours as needed for Pain.       Route:   Oral       PRN Reason(s):   Pain       Future Appointments  Date Time Provider Departme

## 2017-03-29 NOTE — PROGRESS NOTES
Progress Note Details  Patient Name: Sheree No  Date: 3/29/2017   Patient Number: [id number] Physician / Extender: Radames Leos   Patient YOB: 1933 Facility: [facility]    Chief Complaint  This information was obtained 3/10/2017: Patient doing well. Underwent Left femoral to below-knee popliteal artery bypass using a 6 x 80 Propaten graft. The inflow was the left limb of the aortobifemoral bypass on 2/28/17 by Dr Tammi Baker. He has been recuperating at home with [facility].  Heel ul Integumentary (Hair/Skin/Nails): Prone to Skin Tears (on coumadin), Change: Hair, Nails, Skin (No hair growth to lower legs, thin skin, hemosiderin staining), Lumps (left shin location of mike), Ulcers (arterial ulcers to left heel and toes)    Patient julio cesar Wound #2 Left, Lateral Third Toe is a chronic Full Thickness Arterial Ulcer and has received a status of Not Healed. Subsequent wound encounter measurements are 0.2cm length x 0.3cm width with no measurable depth, with an area of 0.06 sq cm .  No tunneling (Encounter Diagnosis) Z98.676 - Atherosclerosis of native arteries of extremities with rest pain, left leg  (Encounter Diagnosis) I70.244 - Atherosclerosis of native arteries of left leg with ulceration of heel and midfoot  (Encounter Diagnosis) I70.245 - Perfusion assessed by palpation of pulses. - +2 pedal pulses   Last sharp debridement date: - 3/21/17 to left heel.     Wound #3 Left Heel     Topicals:  Initial Anesthetic Order: Apply lidocaine to wound bed on all future wound center visits during prepara

## 2017-04-10 NOTE — PROGRESS NOTES
CC: urinary symptoms    HPI:     Location urethral  Quality frequent, abnormal  Severity moderate  Duration 4-5   Modifying factors tried no treatment    Recently anemic. Due for repeat testing.        ROS: no fever, no new pains, no rashes    Past Medical fluids. Culture will be sent. Call if any fever or new symptoms such as back pain or chills develops. Call if any concerns.       Orders Placed This Encounter  Urine Dip, auto without Micro  Urine Culture, Routine [E]    Meds & Refills for this Visit:  Si

## 2017-04-12 NOTE — PROGRESS NOTES
Progress Note Details  Patient Name: Janis Garrison  Date: 4/12/2017   Patient Number: [id number] Physician / Extender: Regan Salgado   Patient YOB: 1933 Facility: [facility]    Chief Complaint  This information was obtained 3/10/2017: Patient doing well. Underwent Left femoral to below-knee popliteal artery bypass using a 6 x 80 Propaten graft. The inflow was the left limb of the aortobifemoral bypass on 2/28/17 by Dr Rachel Pichardo. He has been recuperating at home with [facility].  Heel ul Patient complains of:   Cardiovascular (Central/Peripheral): Edema (Left lower leg)  Musculoskeletal: Assistive Devices (wheel chair/walker), Deformities (Metal mike protruding under skin at left shin), Muscle Weakness (generalized)  Integumentary (Hair/Ski Wound #2 Left, Lateral Third Toe is a chronic Full Thickness Arterial Ulcer and has received a status of Not Healed.  Subsequent wound encounter measurements are 0.1cm length x 0.1cm width x 0.1cm depth, with an area of 0.01 sq cm and a volume of 0.001 cubi Wound Cleansing & Dressings  May shower with protection. Hydrofera ready - double up in between toes   Kerlix  Paper tape  Moisturizer to surrounding skin.  - to leg and heel  Change dressing 3x/week - Twice by home health care and once during clinic visit

## 2017-04-12 NOTE — PROGRESS NOTES
Progress Note Details  Patient Name: Jojo Adair  Date: 4/5/2017   Patient Number: [id number] Physician / Humaira Benavides: Micky Dietirch   Patient YOB: 1933 Facility: [facility]    Chief Complaint  This information was obtained f 3/10/2017: Patient doing well. Underwent Left femoral to below-knee popliteal artery bypass using a 6 x 80 Propaten graft. The inflow was the left limb of the aortobifemoral bypass on 2/28/17 by Dr Leena Mcleod. He has been recuperating at home with [facility].  Heel ul Musculoskeletal: Assistive Devices (wheel chair), Deformities (Metal mike protruding under skin at left shin), Muscle Weakness (generalized)  Integumentary (Hair/Skin/Nails): Prone to Skin Tears (on coumadin), Change: Hair, Nails, Skin (No hair growth to lo Sitting or standing Blood Pressure < 130/86. Pulse regular . Respirations regular, non-labored and within the target range. Temperature normal and within the target range . Well developed, well nourished with pleasant effect. In no apparent distress. Sherie Kong The periwound skin exhibited: Dry/Scaly.  The periwound skin did not exhibit: Brawny Induration, Edema, Excoriation, Induration, Callus, Crepitus, Fluctuance, Friable, Rash, Moist, Maceration, Atrophie Mara, Cyanosis, Ecchymosis, Erythema, Hemosiderosis, Compression to Left Leg    Follow-Up Appointments  Return Appointment in 1 week. - With Jolly Mortensen/Additional Orders  S/S of Infection - Call PCP, clinic or go to the ER. Care summary  Reviewed and evaluated labs.  - 2/20/17-Alb-3.9.  3/14/17-S/p

## 2017-04-19 NOTE — PROGRESS NOTES
Progress Note Details  Patient Name: Anita Long  Date: 4/19/2017   Patient Number: [id number] Physician / Extender: Issa Vasquez   Patient YOB: 1933 [facility]    Chief Complaint  This information was obtained 3/10/2017: Patient doing well. Underwent Left femoral to below-knee popliteal artery bypass using a 6 x 80 Propaten graft. The inflow was the left limb of the aortobifemoral bypass on 2/28/17 by Dr Shirley Reynoso. He has been recuperating at home with [facility].  Heel ul 4/19/17-Toe ulcer healed. Hydrofera ready for 1 week only between toes to protect young epithelial tissue breakdown. D/c'd from outpatient wound care service.     Complaints and Symptoms  This information was obtained from the patient  Patient complains of: Wound #2 Left, Lateral Third Toe is a chronic Full Thickness Arterial Ulcer and has received a status of Not Healed.  Subsequent wound encounter measurements are 0.1cm length x 0.1cm width x 0.1cm depth, with an area of 0.01 sq cm and a volume of 0.001 cubi Entered By: Violette Nix on 04/19/2017 11:28:58

## 2017-04-25 NOTE — TELEPHONE ENCOUNTER
LOV: 4/10/17 cystitis     CELECOXIB 200 MG Oral Cap 180 capsule 0 9/23/2016       Sig :  TAKE 1 CAPSULE BY MOUTH TWICE DAILY AS DIRECTED       Route:   (none)       No future appointments. Please advise.

## 2017-06-22 NOTE — PATIENT INSTRUCTIONS
Herman Navarro's SCREENING SCHEDULE   Tests on this list are recommended by your physician but may not be covered, or covered at this frequency, by your insurer. Please check with your insurance carrier before scheduling to verify coverage.     PREVENTATI cigarettes in their lifetime   • Anyone with a family history    Colorectal Cancer Screening Covered up to Age 76     Colonoscopy Screen   Covered every 10 years- more often if abnormal Colonoscopy,10 Years due on 12/06/1983 Update Health Maintenance if ap Only covered with a cut with metal- TD and TDaP Not covered by Medicare Part B) No orders found for this or any previous visit.  This may be covered with your prescription benefits, but Medicare does not cover unless Medically needed    Zoster (Not covered

## 2017-06-22 NOTE — PROGRESS NOTES
HPI:   April Gordillo is a 80year old male who presents for a Medicare Subsequent Annual Wellness visit (Pt already had Initial Annual Wellness). Doing ok.    His last annual assessment has been over 1 year: Annual Physical due on 05/10/2017         P Results  Component Value Date   WBC 8.5 04/10/2017   HGB 11.3* 04/10/2017   .0 04/10/2017        ALLERGIES:   He is allergic to morphine.     CURRENT MEDICATIONS:     Outpatient Prescriptions Marked as Taking for the 6/22/17 encounter (Office Visit) patient documented not to have experienced any of the following events (10/3/2012); injection, w/wo contrast, dx/therapeutic substance, epidural/subarachnoid; lumbar/sacral (10/22/2012); fluor gid & loclzj ndl/cath spi dx/ther njx (10/22/2012); patient wit following events (1/17/2014); injection, w/wo contrast, dx/therapeutic substance, epidural/subarachnoid; lumbar/sacral (4/30/2014); fluor gid & loclzj ndl/cath spi dx/ther njx (4/30/2014); patient withough preoperative order for iv antibiotic surgical site surgical site infection prophylaxis.  (N/A, 2/5/2016); patient documented not to have experienced any of the following events (N/A, 2/5/2016); pacemaker/defibrillator; injection, w/wo contrast, dx/therapeutic substance, epidural/subarachnoid; lumbar/sacral 97.4 °F (36.3 °C) (Temporal)  Resp 12  Ht 65.5\"  Wt 155 lb  BMI 25.39 kg/m2  SpO2 94%  General appearance: alert, appears stated age and cooperative  Lungs: clear to auscultation bilaterally  Heart: S1, S2 normal, no murmur, click, rub or gallop, regular patient maintain a good energy level?: Other (none)    How would you describe your daily physical activity?: None    How would you describe your current health state?: Good    How do you maintain positive mental well-being?: Visiting Family    If you are a Assessment section in Charting, test patient and document. Then, refresh your progress note to see your input here.   Cognitive Assessment     What day of the week is this?: Correct    What month is it?: Correct    What year is it?: Correct    Recall \"B any previous visit. Hepatitis B No orders found for this or any previous visit. Tetanus No orders found for this or any previous visit.          [address] [facility] [address] Internal Lab or Procedure External Lab or Procedure   Annual Monitoring of P

## 2017-07-03 NOTE — TELEPHONE ENCOUNTER
Spoke with patient's wife. Wife wanted to confirm if folic acid is still needed. Cardiologist did not advise patient to stop medication, but just wanted to know if he should continue to take it.   Please advise if patient should continue to take this medi

## 2017-08-08 NOTE — TELEPHONE ENCOUNTER
Last refill: 6-1-2017 #100 with 0 refills  Last Visit: 6- for complete px  Next Visit: No future appointments. Forward to Dr. Dave Nicole please advise on refills. Thanks.

## 2017-08-08 NOTE — TELEPHONE ENCOUNTER
6AM WAS /125 BP WHEN HE WOKE UP AT 11, THEN IT CAME /105,  THEN HE TOOK HIS MEDS 142/84, IS HAVING BOWEL ISSUES,NO TEMP JUST DID HIS INHALER.  [address]

## 2017-08-08 NOTE — TELEPHONE ENCOUNTER
Spoke to Baljit, she states after using Proair, pt is looking and feeling better. His blood pressure went back down to 142/84. His diarrhea only lasts one day. Advised Nivia to go to ER if pt develops chest pain, SOB, vision changes, or confusion.  Markel Fuller

## 2017-08-14 NOTE — TELEPHONE ENCOUNTER
LOV: 6/22/17 for annual physical    CELECOXIB 200 MG Oral Cap 180 capsule 0 4/25/2017    Sig :  TAKE 1 CAPSULE BY MOUTH TWICE DAILY AS DIRECTED     Route:   (none)       No future appointments. Please advise.

## 2017-08-28 NOTE — PROGRESS NOTES
CC: malaise    HPI:      Quality not feeling well, weak  Severity moderate  Duration several weeks  Timing daily  Context is using medical marijuana - helping him to minimize use of narcotic pain pills  Associated symptoms increased work of breathing    RO Consults:  None

## 2017-09-05 NOTE — PROGRESS NOTES
CC: abnormal lab    HPI:     Location cbc  Quality hgb low, plt low, mch high  Severity moderate  Duration chronic  Timing worst reading this year    ROS: cough is better, breathing better per wife, no new bruising, no night sweats    Past Medical History:

## 2017-09-18 NOTE — TELEPHONE ENCOUNTER
WIFE STATES HE IS NOT FEELING WELL, WHEN HE HAS TO HAVE A BM BEFORE HE GETS TO TOILET HE HAS MESSED HIS PANTS, WANTS TO TALK ABOUT HIM

## 2017-09-18 NOTE — TELEPHONE ENCOUNTER
FAITH Reynolds  Pt's wife Yesenia Briceño calling with condition update  Patient's wife states he did not do well over the weekend. Wife states his feet were swollen last night. Swelling went down a little bit this morning. Wife states he did not improve.   Wife w

## 2017-09-21 PROBLEM — D69.6 THROMBOCYTOPENIA (HCC): Status: ACTIVE | Noted: 2017-01-01

## 2017-09-21 PROBLEM — R04.2 HEMOPTYSIS: Status: ACTIVE | Noted: 2017-01-01

## 2017-09-21 PROBLEM — D51.8 VITAMIN B12 DEFICIENCY (DIETARY) ANEMIA: Status: ACTIVE | Noted: 2017-01-01

## 2017-09-21 NOTE — TELEPHONE ENCOUNTER
Patient was told to call  THE [facility] with update. Would you like me to get the patient on the line? Please advise.

## 2017-09-21 NOTE — PROGRESS NOTES
Patient here for initial MD visit. Referred by Dr. Fatemeh Mccabe for abnormal labs. B-12 deficiency. C/O fatigue and BLE edema.

## 2017-09-21 NOTE — PROGRESS NOTES
Hematology Initial Consultation Note    Patient Name: Tim Wilson Record Number: [id number]    YOB: 1933   Date of Consultation: 9/21/2017   PCP: Dr. Jay Schilling  Other providers:  Dr. Tony Gordon    Reason for Consultation: EPIDUROGRAPHY, RADIOLOGICAL S & I N/A      Comment: Procedure: CAUDAL;  Surgeon: Virgilio Chatman MD;  Location: [address] [place] [address] MANAGEMENT  10/3/2012: Marry RIOS & Gila Rich NDL/CATH SPI DX/THER DGG      Comment: Procedure: CAUDAL;  Surgeon: PAIN MANAGEMENT  No date: HIP REPLACEMENT SURGERY  10/3/2012: INJECTION, W/WO CONTRAST, DX/THERAPEUTIC SUBST*      Comment: Procedure: CAUDAL;  Surgeon: Carey Garrett MD;  Location: [address] [place] [address] MANAGEMENT  10/22/2012: INJECTION, W Procedure: CAUDAL;  Surgeon: Alana Tolbetr MD;  Location: [address] [place] [address] MANAGEMENT  9/25/2015: INJECTION, W/WO CONTRAST, DX/THERAPEUTIC SUBST* N/A      Comment: Procedure: CAUDAL;  Surgeon: Alana Tolbert MD;  [facility] EXPERIENCED ANY*      Comment: Procedure: CAUDAL;  Surgeon: Marylee Parry, MD;  Location: [address] [place], [place] [address] MANAGEMENT  7/9/2014: PATIENT DOCUMENTED NOT TO HAVE EXPERIENCED ANY* N/A      Comment: Procedure: CAUDAL;  Surgeon: Ariel Wu, [facility]  8/28/2013: PATIENT [place] PREOPERATIVE ORDER FOR IV ANT*      Comment: Procedure: CAUDAL;  Surgeon: Herman Quinn MD;  Location: [address] [place] [address] MANAGEMENT  1/17/2014: PATIENT [address] [place] [address] 6 (six) hours as needed for Pain. Disp: 100 tablet Rfl: 0   Lisinopril-Hydrochlorothiazide 10-12.5 MG Oral Tab 1 tablet by mouth twice daily Disp: 30 tablet Rfl: 11   aspirin (ASPIR-81) 81 MG Oral Tab EC Take 1 tablet (81 mg total) by mouth daily.  Disp: 1 Systems:  A 10-point ROS was done with pertinent positives and negative per the HPI    Vital Signs:  Height: 172.7 cm (5' 8\") (09/21 1335)  Weight: 76.2 kg (168 lb) (09/21 1335)  BSA (Calculated - sq m): 1.9 sq meters (09/21 1335)  Pulse: 60 (09/21 1335) Value Date   PTT 34.8 (H) 02/20/2017   PT 22.8 (H) 11/07/2013   INR 1.27 (H) 02/23/2017     Component      Latest Ref Rng & Units 9/5/2017   Vitamin B12      193 - 986 pg/mL 279   MMA SERUM/PLASMA, VITAMIN B12      0.00 - 0.40 umol/L 0.57 (H)   FOLATE (FOL

## 2017-09-21 NOTE — PATIENT INSTRUCTIONS
For Dr. Begum Smoker nurse line, call [phone] with any questions or concerns Monday through Friday 8:00 to 4:30. After hours or weekends for emergent needs [phone].     Start taking cyanocobalamin (vitamin B12) 1000mcg (or 1 mg) daily

## 2017-09-21 NOTE — TELEPHONE ENCOUNTER
Spoke with patient. Will have appt with specialist today and he will call back later with update on his status.

## 2017-09-22 NOTE — TELEPHONE ENCOUNTER
Spoke with patient. He is doing about the same. He has testing with specialists pending. He will follow up next week.

## 2017-09-26 NOTE — TELEPHONE ENCOUNTER
Pt's wife called stated she would like to know what the 3 blood tests were from 3 different doctors. Also, she stated her  looks really pale and weak and was wondering what he could do.

## 2017-09-26 NOTE — TELEPHONE ENCOUNTER
Patient had questions regarding lab work drawn at Dr. Jayant Goldstein office as well as labs drawn at our office. Informed patient's wife, patient had a CBC drawn here.   Per results note - CBC showed anemia - patient followed up here with Dr. Felipa Lezama regarding h

## 2017-09-29 NOTE — TELEPHONE ENCOUNTER
celecoxib 200 MG Oral Cap 180 capsule 0 8/15/2017    Sig :  TAKE 1 CAPSULE BY MOUTH TWICE DAILY AS DIRECTED     Route:   (none)       Spoke with the pharmacist regarding patient's refill. Medication filled 8/15/17 for 90 days.   Patient should not be out o

## 2017-09-29 NOTE — TELEPHONE ENCOUNTER
Pt's wife called stated he has not been feeling well very weak. Jc Pemberton She was wanting Dr Samual Pallas to explain to them again what is going on. She stated Dr. Nick Hoang put him on B-12 injection. She would like to know what his going on.

## 2017-09-29 NOTE — TELEPHONE ENCOUNTER
Patient's wife would like to speak with Dr. Luz Joseph personally. Please advise. Would you like patient to schedule an appointment to get all questions answered?

## 2017-09-30 NOTE — TELEPHONE ENCOUNTER
Home phone is best number to reach patients wife out. [phone]   Please call Angel Alvarenga, patients wife.

## 2017-10-02 NOTE — TELEPHONE ENCOUNTER
Spoke with wife. Patient weak, undergoing testing. Will do stool guaiac cards x 3. Await Dr. George Mendenhallole input as well.

## 2017-10-02 NOTE — TELEPHONE ENCOUNTER
\"Please call pt and have him come in early next week for repeat blood draw to make sure his counts are stable/improving on the vitamin B12. CBC and retic count ordered. \"    Called to wife. She will go to [facility] lab this afternoon.

## 2017-10-04 NOTE — ED PROVIDER NOTES
Patient Seen in: [address] [facility]    History   Patient presents with:  Dyspnea DALY SOB (respiratory)    Stated Complaint: trouble breathing    HPI    Tl Domínguez is A: 80 y male presents with chief complaints of shortness of breath for NDL/CATH SPI DX/THER LPD      Comment: Procedure: CAUDAL;  Surgeon: Alana Tolbert MD;  Location: [address] [place] [address] MANAGEMENT  1/29/2013: Henny RIOS & Jyoti Lew NDL/CATH SPI DX/THER BGS      Comment: Procedure: CAUDAL;  Surgeon: Aleisha Pavon, MD;  Location: [address] [place] [address] [place] MANAGEMENT  1/29/2013: INJECTION, W/WO CONTRAST, DX/THERAPEUTIC SUBST*      Comment: Procedure: CAUDAL;  Surgeon: Jaci Mandel MD;  Location: [address] [place] [address] [place] MANAGEMENT  6/19/2013: INJECTION, W/WO C Procedure: CAUDAL;  Surgeon: Ray Sun MD;  Location: [address] [place] [address] MANAGEMENT  6/14/2016: INJECTION, W/WO CONTRAST, DX/THERAPEUTIC SUBST* N/A      Comment: Procedure: CAUDAL;  Surgeon: Gabino Beltran MD;  Anne Herr EXPERIENCED ANY* N/A      Comment: Procedure: CAUDAL;  Surgeon: Carey Garrett MD;  Location: [address] [place] [address] MANAGEMENT  12/16/2014: PATIENT DOCUMENTED NOT TO HAVE EXPERIENCED ANY* N/A      Comment: Procedure: CAUDAL;  Surgeon: Kandice Shaffer [address] MANAGEMENT  4/30/2014: PATIENT [place] PREOPERATIVE ORDER FOR IV ANT*      Comment: Procedure: CAUDAL;  Surgeon: Astrid Maki MD;  Location: [address] [place] [address] FOR PAIN MANAGEMENT  7/9/2014: PATIENT [facility]    Positive for stated complaint: trouble breathing  Other systems are as noted in HPI. Constitutional and vital signs reviewed. All other systems reviewed and negative except as noted above.     PSFH elements reviewed from today and agreed

## 2017-10-12 NOTE — TELEPHONE ENCOUNTER
Quincy Ortega from [facility] stated pt was in their care and he passed away this morning at 3:58am.

## 2017-10-19 ENCOUNTER — APPOINTMENT (OUTPATIENT)
Dept: HEMATOLOGY/ONCOLOGY | Facility: HOSPITAL | Age: 82
End: 2017-10-19
Attending: INTERNAL MEDICINE
Payer: MEDICARE

## 2017-11-28 ENCOUNTER — PRIOR ORIGINAL RECORDS (OUTPATIENT)
Dept: OTHER | Age: 82
End: 2017-11-28

## 2018-11-23 ENCOUNTER — IMAGING SERVICES (OUTPATIENT)
Dept: OTHER | Age: 83
End: 2018-11-23

## 2019-01-24 ENCOUNTER — IMAGING SERVICES (OUTPATIENT)
Dept: OTHER | Age: 84
End: 2019-01-24

## 2019-02-28 VITALS — HEART RATE: 64 BPM | SYSTOLIC BLOOD PRESSURE: 118 MMHG | DIASTOLIC BLOOD PRESSURE: 70 MMHG

## 2019-02-28 VITALS
BODY MASS INDEX: 26.06 KG/M2 | DIASTOLIC BLOOD PRESSURE: 60 MMHG | HEIGHT: 67 IN | WEIGHT: 166 LBS | SYSTOLIC BLOOD PRESSURE: 100 MMHG | HEART RATE: 62 BPM

## 2019-03-01 VITALS
HEART RATE: 58 BPM | BODY MASS INDEX: 26.98 KG/M2 | SYSTOLIC BLOOD PRESSURE: 108 MMHG | DIASTOLIC BLOOD PRESSURE: 60 MMHG | WEIGHT: 178 LBS | HEIGHT: 68 IN

## 2019-03-01 VITALS
DIASTOLIC BLOOD PRESSURE: 60 MMHG | HEART RATE: 66 BPM | HEIGHT: 67 IN | BODY MASS INDEX: 23.7 KG/M2 | SYSTOLIC BLOOD PRESSURE: 116 MMHG | WEIGHT: 151 LBS

## 2019-03-01 VITALS — DIASTOLIC BLOOD PRESSURE: 60 MMHG | HEART RATE: 64 BPM | SYSTOLIC BLOOD PRESSURE: 92 MMHG

## 2019-03-06 NOTE — ED INITIAL ASSESSMENT (HPI)
Patient arrives with his wife for outpatient blood draw. Upon arrival to [facility], they wanted to see if he could be seen by Dr. Rhoda Rowley. Patient reports shortness of breath over the past 2 weeks and progressively getting worse.    Patient is pale
stated

## 2024-09-03 NOTE — MR AVS SNAPSHOT
[address] [facility] [address] [phone]               Thank you for choosing us for your health care visit with Nathaniel Dowell DO.   We are glad to serve you and happy to provide you with this summary of your vi This patient is being seen in consultation from Curry Cota DO.    CHIEF COMPLAINT(S): No chief complaint on file.      HISTORY OF PRESENT ILLNESS:  Radha BROOKE is a 50 year old {[facility] right/left handed:443933} female presenting to the clinic for an initial evaluation of her left hand middle finger. Patient was seen at [facility] on 24 where x-rays were obtained. Patient reports the pain started *** and occurred when ***.  Patient localizes the pain to ***.  *** aggravate the patient's pain.  Interventions include ***.  Patient denies*** any history of injections to the involved area.  Patient reports no*** previous history of injury.    Accompanied by ***  Location:left hand  Date of Onset: ***  Context: See above   Did this injury occur at work: {sskyesno:026121}  Occupation: ***  Severity:{#:20291}/10   Timing: {[facility] pain timin}  Quality: {[facility] pain adjective:170172}  Associated signs and symptoms: {[facility] pain related symptoms:622387}  Aggravating factors: {[facility] aggrevating factors:315449}  Alleviating factors: {[facility] alleviating factors:989294}  What is your goal: ***  ***    How much of your personal health information do you want discussed with your family/friends? ***. Who can we discuss information with? ***    Review of Systems   Constitutional:  Negative for chills, fatigue and fever.   HENT:  Negative for ear discharge, ear pain, nosebleeds, sore throat, tinnitus and trouble swallowing.    Eyes:  Negative for pain.   Respiratory:  Negative for chest tightness, shortness of breath and wheezing.    Cardiovascular:  Negative for chest pain and palpitations.   Gastrointestinal:  Negative for abdominal pain, constipation, diarrhea, nausea and vomiting.   Musculoskeletal:  Negative for back pain, gait problem, joint swelling and myalgias.   Skin:  Negative for rash and wound.   Allergic/Immunologic: Negative for environmental allergies and food allergies.   Neurological:  Negative  for dizziness, weakness, light-headedness, numbness and headaches.   Psychiatric/Behavioral:  Negative for agitation and sleep disturbance. The patient is not nervous/anxious.        The patient was instructed to follow up with PCP regarding all positive ROS that were not directly pertinent with the chief complaint.    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  Reviewed and non-contributory to patient's illness unless otherwise stated above    Past Social History Updated: {YES (DEF)/NO:03024}  SOCIAL HISTORY:  Social History     Socioeconomic History    Marital status: /Civil Union     Spouse name: Not on file    Number of children: Not on file    Years of education: Not on file    Highest education level: Not on file   Occupational History    Not on file   Tobacco Use    Smoking status: Some Days     Types: Cigarettes    Smokeless tobacco: Never   Vaping Use    Vaping status: never used   Substance and Sexual Activity    Alcohol use: Not Currently     Comment: socially, 2-3 in a week    Drug use: Never    Sexual activity: Yes     Partners: Female   Other Topics Concern    Not on file   Social History Narrative    Not on file     Social Determinants of Health     Financial Resource Strain: Not on file   Food Insecurity: Not on file   Transportation Needs: Not on file   Physical Activity: Not on file   Stress: Not on file   Social Connections: Not on file   Interpersonal Safety: Not At Risk (8/18/2023)  • Previous elevated impaired FBS or GTT   … or any two of the following:   • Overweight (BMI ³25 but <30)   • Family history of diabetes   • Age 72 years or older   • History of gestational diabetes or birth of baby weighing more than 9 pounds     Covered    Received from [facility], [facility]     Threatened: Not on file     Insulted: Not on file     Physically Hurt : Not on file     Scream: Not on file        MEDICATIONS:  Current Outpatient Medications   Medication Sig Dispense Refill    buPROPion XL (WELLBUTRIN XL) 150 MG 24 hr tablet Take 1 tablet by mouth 2 times daily. 180 tablet 1    carvedilol (COREG) 6.25 MG tablet Take 1 tablet by mouth in the morning and 1 tablet in the evening. Take with meals. 180 tablet 1    thiamine (VITAMIN B1) 100 MG tablet Take 1 tablet by mouth daily. 90 tablet 1    amLODIPine (NORVASC) 10 MG tablet Take 1 tablet by mouth daily. 90 tablet 1    topiramate (TOPAMAX) 50 MG tablet Take 1 tablet by mouth in the morning and 1 tablet in the evening. 180 tablet 1    methylPREDNISolone (MEDROL DOSEPAK) 4 MG tablet Take 1 tablet by mouth as directed. follow package directions 21 tablet 0    albuterol 108 (90 Base) MCG/ACT inhaler Inhale 2 puffs into the lungs every 4 hours as needed for Shortness of Breath or Wheezing. 1 each 3    sertraline (ZOLOFT) 25 MG tablet Take 25 mg by mouth daily. (Patient not taking: Reported on 8/31/2024)      traZODone (DESYREL) 50 MG tablet Take 50 mg by mouth nightly.      Multiple Vitamin (MULTIVITAMIN ADULT PO) 1 tablet.      Calcium Carb-Cholecalciferol 500-10 MG-MCG Chew Tab TAKE 1 TABLET BY MOUTH TWICE A DAY AFTER MEALS      ascorbic acid (VITAMIN C) 500 MG tablet Take 500 mg by mouth daily.      desonide (DESOWEN) 0.05 % cream Apply topically 2 times daily. For up to 2 weeks. 15 g 0     No current facility-administered medications for this visit.     ALLERGIES:     ALLERGIES:   Allergen Reactions    Wound Dressing Adhesive RASH       VITAL SIGNS:  Visit Vitals  LMP  (LMP Unknown)     There is no height or weight on file to calculate BMI.    EXAM:  This is a 50 year old female, awake, alert, and cooperative. She is well nourished, well developed, and in no apparent distress.  Pulmonary -  Covered annually for Diabetics, people with Glaucoma family history,   Americans over age 48   Americans over age 72 No flowsheet data found.  OK to schedule if you are in this risk group, make sure you have a referral   Prostate Cancer Scree No increased work of breathing.  Lymphatics - There is no evidence of generalized adenopathy.  ***    IMAGING &TEST:  Narrative & Impression   EXAM: XR FINGER(S) 2 OR MORE VIEWS LEFT     CLINICAL INDICATION: Left third digit pain. Stepped on by dog.     COMPARISON: None.     FINDINGS:     Subtle lucency at the dorsal base of the third digit middle phalanx which  could reflect nondisplaced fracture. There is soft tissue swelling about  the third digit. No additional displaced fracture. No retained foreign  body.     Carpal bones are grossly intact. Remaining visualized osseous structures  are grossly intact.     IMPRESSION:  1. Possible subtle nondisplaced fracture at the dorsal base of the third  digit middle phalanx. Correlate with point tenderness.  2. No retained foreign body.              Electronically Signed by: MAKAYLA MORGAN MD   Signed on: 8/31/2024 4:02 PM   Workstation ID: [id number]       ASSESSMENT & PLAN:  Radha BROOKE is a 50 year old female ***   1. ***  2. ***  3. ***  4. ***     Restrictions: ***    The patient will follow up with us in {NUMBERS 1-31:427671} {days wks mos yr:426427}      Patient was seen with ancillary staff present. (This includes nurses, nurse practitioners, physician assistants, athletic trainers and medical assistants).  Ancillary staff notes were reviewed and accepted.    Electronically Signed by:    Nolan Monson MD, 09/03/24    This note has been routed to the referring provider.    Note to patient: The 21st Century Cures Act makes medical notes like these available to patients in the interest of transparency. However, be advised this is a medical document. It is intended as peer to peer communication. It is written in medical language and may contain abbreviations or verbiage that are unfamiliar. It may appear blunt or direct. Medical documents are intended to carry relevant information, facts as evident, and the clinical opinion of the practitioner.   http://www. [place]ph.state. [place]/public/books/advin.htm  A link to the [employer]. This site has a lot of good information including definitions of the different types of Advance Directives.  It also has the State forms available on it's web Pneumococcal (Prevnar 13)       MyChart               Educational Information     TOP FALL PREVENTION TIPS    INSIDE YOUR HOME   KITCHEN:  ? Use non skid mats only. ? Clean up spills as soon as they happen. ?  Keep objects that you use often within eas EAT THESE FOODS MORE OFTEN: EAT THESE FOODS LESS OFTEN:   Make half your plate fruits and vegetables Highly refined, white starches including white bread, rice and pasta   Eat plenty of protein, keep the fat content low Sugars:  sodas and sports drinks, ca

## (undated) DEVICE — ESMARCH P/F TEXTURED 4" X 9' 10/BX

## (undated) DEVICE — CHLORAPREP 26ML APPLICATOR

## (undated) DEVICE — Device

## (undated) DEVICE — SUTURE PROLENE 7-0 BV-1

## (undated) DEVICE — PROXIMATE RH ROTATING HEAD SKIN STAPLERS (35 WIDE) CONTAINS 35 STAINLESS STEEL STAPLES: Brand: PROXIMATE

## (undated) DEVICE — SUTURE PROLENE 6-0 BV-1

## (undated) DEVICE — ADHESIVE MASTISOL 2/3CC VL

## (undated) DEVICE — INDICATED FOR SURGICAL CLAMPING DURING CARDIOVASCULAR PERIPHERAL VASCULAR, AND GENERAL SURGERY.: Brand: SOFT/FIBRA® SPRING CLIP

## (undated) DEVICE — 3M(TM) TEGADERM(TM) TRANSPARENT FILM DRESSING FRAME STYLE 1628: Brand: 3M™ TEGADERM™

## (undated) DEVICE — CONVERTORS STOCKINETTE: Brand: CONVERTORS

## (undated) DEVICE — DRAPE WARMER ORS-100

## (undated) DEVICE — CLAMP INSERT: Brand: STEALTH® CLAMP INSERT

## (undated) DEVICE — PDS II VLT 0 107CM AG ST3: Brand: ENDOLOOP

## (undated) DEVICE — CV PACK-LF: Brand: MEDLINE INDUSTRIES, INC.

## (undated) DEVICE — BANDAGE ROLL,100% COTTON, 6 PLY, LARGE: Brand: KERLIX

## (undated) DEVICE — HEMOCLIP MED 24 CLIP/CARTRIDGE

## (undated) DEVICE — DRAIN RELIAVAC 100CC

## (undated) DEVICE — 3M™ IOBAN™ 2 ANTIMICROBIAL INCISE DRAPE 6651EZ: Brand: IOBAN™ 2

## (undated) DEVICE — SUTURE SILK 3-0

## (undated) DEVICE — 3M™ STERI-STRIP™ REINFORCED ADHESIVE SKIN CLOSURES, R1547, 1/2 IN X 4 IN (12 MM X 100 MM), 6 STRIPS/ENVELOPE: Brand: 3M™ STERI-STRIP™

## (undated) DEVICE — TRAY ENDOVEIN KTV16 HARVESTING

## (undated) DEVICE — DRAPE HALF 40X58 DYNJP2410

## (undated) DEVICE — SUTURE VICRYL 2-0 CT-1

## (undated) DEVICE — DERMABOND LIQUID ADHESIVE

## (undated) DEVICE — BARD® PTFE FELT PLEDGETS, (OVAL), 4.8 MM X 6 MM: Brand: BARD® PTFE FELT PLEDGETS

## (undated) DEVICE — GAUZE SPONGES,12 PLY: Brand: CURITY

## (undated) DEVICE — DRAPE C-ARM UNIVERSAL

## (undated) DEVICE — SUTURE SILK 2-0

## (undated) DEVICE — ZIMMER® STERILE DISPOSABLE TOURNIQUET CUFF WITH PLC, DUAL PORT, SINGLE BLADDER, 24 IN. (61 CM)

## (undated) DEVICE — SOL  .9 1000ML BTL

## (undated) DEVICE — STERILE POLYISOPRENE POWDER-FREE SURGICAL GLOVES: Brand: PROTEXIS

## (undated) DEVICE — GLOVE SURG TRIUMPH SZ 8

## (undated) DEVICE — ALARIS STOPCOCK 4 WAY

## (undated) DEVICE — SUTURE VICRYL 3-0 CT-1

## (undated) DEVICE — SPONGE RAYTEC 4X4 RF DETECT

## (undated) DEVICE — BASIC DOUBLE BASIN 1-LF: Brand: MEDLINE INDUSTRIES, INC.

## (undated) DEVICE — HEMOCLIP HORIZON SM MULTI

## (undated) DEVICE — STRL PENROSE DRAIN 18" X 1/4": Brand: CARDINAL HEALTH

## (undated) DEVICE — TUBING PRESSURE 48

## (undated) DEVICE — TUBING PRESSURE 72

## (undated) DEVICE — TOWEL,OR,DSP,ST,WHITE,DLX,4/PK,20PK/CS: Brand: MEDLINE

## (undated) DEVICE — DRAIN SILICONE FLAT 7MM

## (undated) NOTE — IP AVS SNAPSHOT
[facility] [place] [address] [place], [address] ~ [phone]                Discharge Summary   1/31/2017    Buzzy Fleet [place] [facility] [address]           Admission Information        Provider Department    1/31/2017 Davion Espitia MD [facility] Dat [facility] Take 1 tablet by mouth every 6 (six) hours as needed for Pain.    Stop taking on:  2/13/2017    Grey Ruff     [    ]    [    ]    [    ]    [    ]       Lisinopril-Hydrochlorothiazide 10-12.5 MG Tabs        1 tablet by mouth twice daily    Sirisha Barajas [address] ([address])    [address]   [phone]              Immunization History as of 1/31/2017  Never Reviewed    INFLUENZA 9/26/2014      Recent Hematology Lab Results _____________________________________________________________________________    Medication Side Effects - Medications to be taken at home  As your caregivers, we want you to be aware of the medications you are prescribed to take and their potential SIDE E clots, high blood pressure   What to report to your healthcare team: Pain, swelling, rash, fever           Narcotic Medications     HYDROcodone-acetaminophen (NORCO) 5-325 MG Oral Tab       Use:  Treat pain   Most common side effects: Constipation, drowsin

## (undated) NOTE — LETTER
[address] [facility] FOR BLOOD OR BLOOD COMPONENTS      In the course of your treatment, it may become necessary to administer a transfusion of blood or blood components.  This form provides basic information concerning this proc Date: 12/11/2023    Patient Name: Sukhjinder Hansen          To Whom it may concern: This letter has been written at the patient's request. The above patient was seen at the [facility] for treatment of a medical condition. .    The patient may return to work/school on 12/12/2023 without restrictions. Sincerely,          CARMITA Shore PA-C Orthopedic Surgery / Sports Medicine Specialist  [facility]  [address]   [email or web address]  [email or web address]  t: [phone]  o: [phone]  f: [phone]    This note was dictated using Dragon software. While it was briefly proofread prior to completion, some grammatical, spelling, and word choice errors due to dictation may still occur. explain the alternatives to you if it has not already been done. I,Jose Luis Navarro, have read/had read to me the above. I understand the matters bearing on the decision whether or not to authorize a transfusion of blood or blood components.  I have no quest

## (undated) NOTE — MR AVS SNAPSHOT
[address] [facility] [address] [phone]               Thank you for choosing us for your health care visit with Jay Schilling DO.   We are glad to serve you and happy to provide you with this summary of your vi Take 1 tablet (25 mg total) by mouth 2 (two) times daily with meals.    Commonly known as:  COREG           celecoxib 200 MG Caps   TAKE 1 CAPSULE BY MOUTH TWICE DAILY AS DIRECTED   Commonly known as:  CeleBREX           digoxin 0.125 MG Tabs   Take 1 table

## (undated) NOTE — MR AVS SNAPSHOT
[facility] [address] [place] [id number]  [phone]               Thank you for choosing us for your health care visit with [facility].   We are glad to serve you and happy to provide you with this summary of yo Take 1 mg by mouth daily. Commonly known as:  FOLVITE           HYDROcodone-acetaminophen 5-325 MG Tabs   Take 1 tablet by mouth every 6 (six) hours as needed for Pain. Commonly known as:  [address] [place] [address] OR   Take  by mouth.            Lisinopril

## (undated) NOTE — MR AVS SNAPSHOT
[facility]  [place] [address] [address] [phone]               Thank you for choosing us for your health care visit with [facility].   We are glad to serve you and happy to provide you with this summary of yo Take 1 tablet (500 mg total) by mouth daily. Commonly known as:  LEVAQUIN           Lisinopril-Hydrochlorothiazide 10-12.5 MG Tabs   1 tablet by mouth twice daily           Warfarin Sodium 2 MG Tabs   Take 2 mg by mouth daily.    Commonly known as:  Meghan Moreno

## (undated) NOTE — MR AVS SNAPSHOT
[address] [facility] [address] [phone]               Thank you for choosing us for your health care visit with Luz Velasco DO.   We are glad to serve you and happy to provide you with this summary of your vi Allergies as of Jan 20, 2017     Morphine     Hallucinations                Today's Vital Signs     BP Pulse Temp             110/50 mmHg 66 98.4 °F (36.9 °C) (Temporal)            Current Medications          This list is accurate as of: 1/20/17 11:59 PM. [address] [facility] [address], [phone], [address] [facility] [address]     Phone:  [phone]    - Sulfamethoxazole-TMP -160 MG Tabs per tablet            Today's Orders     NM BONE SCAN THREE PHASE  (CPT=78

## (undated) NOTE — MR AVS SNAPSHOT
After Visit Summary   1/24/2017    Lauro Pennington    MRN: [id number]           Visit Information        Provider Department Dept Phone    1/24/2017 12:30 PM [id number]  Wound Care/Hbot [phone]      Allergies as of 1/24/2017  Reviewed on: 1/20/2 Provider Department    1/31/2017  1:00 PM [facility] RADIOLOGIST SPEC IR XR; [facility]    2/3/2017 10:00 AM [facility] NUC RM2; [facility] NUC WAITING AREA; [address] [facility] Nuclear Medicine    2/7/2017 2:45  [facility] [address]

## (undated) NOTE — IP AVS SNAPSHOT
Patient Demographics     Address Phone    [address] [facility] [phone] (Home) *Preferred*  [phone] [facility])      Emergency Contact(s)     Name Relation Home Work Boom [phone]      1 [facility] [    ]       celecoxib 200 MG Caps   Commonly known as:  CeleBREX        TAKE 1 CAPSULE BY MOUTH TWICE DAILY AS DIRECTED    Grey Ruff     [    ]    [    ]    [    ]    [    ]       digoxin 0.125 MG Tabs   Last time this was given:  125 mcg on 2/23/201 Resp 20 Filed at 02/23/2017 1200    Temp 97.9 °F (36.6 °C) Filed at 02/23/2017 0800    SpO2 97 % Filed at 02/23/2017 0800      Patient's Most Recent Weight       Most Recent Value    Patient Weight 75.1 kg (165 lb 9.1 oz)         Lab Results Last 24 Hours HISTORY OF PRESENT ILLNESS:   Patient is a 80year old male who is here for a LLE fem-below-knee bypass for his left toes/heel ulcer. These ulcers on the left toes have been there for a month and the left heel ulcer for about a week.  He has been applying [address] [facility] [place] RIOS & Diana Cazares NDL/CATH SPI DX/THER NJX   10/3/2012     Comment  Procedure: CAUDAL; Surgeon: Lina Loredo MD; Location: [facility] FOR PAIN MANAGEMENT     PATIENT [address] [place] FOR IV ANTIBIOTIC SURGICA [place] FOR PAIN MANAGEMENT     PATIENT DOCUMENTED NOT TO HAVE EXPERIENCED ANY OF THE FOLLOWING EVENTS   1/29/2013     Comment  Procedure: CAUDAL; Surgeon: Marylee Parry, MD; Location: [facility] PAIN [facility]     INJECTION, W/WO CONTRAST, DX/THERA [place] FOR PAIN MANAGEMENT     EPIDUROGRAPHY, RADIOLOGICAL S & I   9/12/2013     Comment  Procedure: CAUDAL; Surgeon: Romel Jacobo MD; Location: [facility] PAIN [facility]     INJECTION, W/WO CONTRAST, DX/THERAPEUTIC SUBSTANCE, EPIDURAL/SUBARACHNOID [facility] [place] NDL/CATH SPI DX/THER [place] Mehdi Jennifer 84  N/A  7/9/2014     Comment  Procedure: CAUDAL; Surgeon: Camryn Hernandez MD; Location: [facility] FOR PAIN MANAGEMENT     PATIENT [address] [place] FOR IV ANTIBIOTIC SURG Comment  Procedure: CAUDAL; Surgeon: Pablito Ng MD; Location: [facility] FOR PAIN MANAGEMENT     PATIENT DOCUMENTED NOT TO HAVE EXPERIENCED ANY OF THE FOLLOWING EVENTS  N/A  12/16/2014     Comment  Procedure: CAUDAL; Surgeon: Pablito Ng MD; [place] FOR PAIN MANAGEMENT     PATIENT [place] PREOPERATIVE ORDER FOR IV ANTIBIOTIC SURGICAL SITE INFECTION PROPHYLAXIS.   N/A  6/14/2016     Comment  Procedure: CAUDAL; Surgeon: Romel Jacobo MD; Location: [place] Left   1+   non-palpable   monophasic signal   monophasic signal   monophasic signal   none       Has superficial ulceration along the heel, 3rd and 5th toes with no tendon or bone exposure             Diagnostic Data:       LABS:  Recent Labs    Lab  02/2 waveforms and an LADARIUS of 0.50. The absolute second digit pressure is 14mmHg. Duplex imaging demonstrates a patent distal graft limb with biphasic flow suggestive of inflow disease and chronic occlusion of the proximal superficial femoral artery.  ----------- several technical and environmental variables and these results should be interpreted in light of the patient&apos;s complete clinical record. Electronically signed by: Patience Bollman Najjar MD [id number]      Lower Ext Arterial Duplex - Vasc Lab    2 superficial femoral: Mild diffuse disease. Left superficial femoral: Proximal vessel lesion: There is a 100%occlusion. Tables: Arterial flow: +------------------------------------+---------+ ! Location ! V sys ! +------------------------------------+-------- proximal !17.3cm/s ! +------------------------------------+---------+ ! Left posterior tibial - mid !13.2cm/s ! +------------------------------------+---------+ ! Left peroneal - distal !13.9cm/s ! +------------------------------------+---------+ ----------- - Impressions The bilateral greater and small saphenous veins were mapped with duplex imaging.  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 ------------------------------------------------------ +----+---------------------+------+ ! GSV ! Left mid calf ! 3.00mm! +----+---------------------+------+ ! GSV ! Left distal calf ! 2.00mm! +----+---------------------+------+ ! GSV ! Left ankle ! 2.40mm! +----+---------------------+------+ ! SSV ! Left popliteal khushboo with absent palpable pulses. He is in a limb-threatened situation and will benefit from revascularization. The plan is for a L fem-below-knee bypass with possible reconstruction of the profunda anastomosis.  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  8/28/2013    Comment Procedure: CAUDAL;  Surgeon: Nabor Astorga MD;  Location: [facility] FOR PAIN MANAGEMENT    PATIENT DOCUMENTED NOT TO HAVE EXPERIENCED ANY PATIENT [place] PREOPERATIVE ORDER FOR IV ANTIBIOTIC SURGICAL SITE INFECTION PROPHYLAXIS.   4/30/2014    Comment Procedure: CAUDAL;  Surgeon: Nicole Johnson MD;  Location: [facility] FOR PAIN MANAGEMENT    PATIENT DOCUMENTED NOT TO HAVE EXPERIENCED ANY CENTER FOR PAIN MANAGEMENT    INJECTION, W/WO CONTRAST, DX/THERAPEUTIC SUBSTANCE, EPIDURAL/SUBARACHNOID; LUMBAR/SACRAL N/A 12/16/2014    Comment Procedure: CAUDAL;  Surgeon: Delmi Chapman MD;  Location: [place] [address] GI & [facility] Comment Procedure: CAUDAL;  Surgeon: Marylee Parry, MD;  Location: [facility] FOR PAIN MANAGEMENT    PACEMAKER/DEFIBRILLATOR      Comment Medtronic ICD    INJECTION, W/WO CONTRAST, DX/THERAPEUTIC SUBSTANCE, EPIDURAL/SUBARACHNOID; LUMBAR/SACRAL N/A 6/14 •  [START ON 2/21/2017] AmLODIPine Besylate (NORVASC) tab 5 mg, 5 mg, Oral, Daily  •  carvedilol (COREG) tab 25 mg, 25 mg, Oral, BID with meals  •  [START ON 2/21/2017] digoxin (LANOXIN) tab 125 mcg, 125 mcg, Oral, Daily  •  Albuterol Sulfate  (90 B Telemetry: atrial fibrillation -- intermittent pacing  General: Alert and oriented in no apparent distress. HEENT: No focal deficits. Neck: No JVD  Cardiac: irregular  Lungs: Clear without wheezes, hyperinflated  Abdomen: Soft, non-tender.    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)     Coronary artery disease involving native heart without angina pectoris     Chronic atrial fibrillation (HCC)     Benign essential HTN      Doing well POD #0 s/p LLE surgical revasculariza • COPD (chronic obstructive pulmonary disease) (HCC)    • Sleep apnea    • Peripheral vascular disease (HCC)    • High blood pressure    • Cataract        Past Surgical History      Past Surgical History    BACK SURGERY      COLONOSCOPY      OTHER SURGICAL EPIDURAL/SUBARACHNOID; LUMBAR/SACRAL  1/29/2013    Comment Procedure: CAUDAL;  Surgeon: Tita Castorena MD;  Location: [place] [address] [address] Jessica Britt NDL/CATH SPI DX/THER [place] Meh[address] 84  1/29/2013    Comment Procedure: CAUDAL;  Surgeon: Kaci Perry INFECTION PROPHYLAXIS.  8/28/2013    Comment Procedure: CAUDAL;  Surgeon: Pratik Booker MD;  Location: [facility] FOR PAIN MANAGEMENT    PATIENT DOCUMENTED NOT TO HAVE EXPERIENCED ANY OF THE FOLLOWING EVENTS  8/28/2013    Comment Procedure: Guillermo Alvarado;  [place] INFECTION PROPHYLAXIS.   4/30/2014    Comment Procedure: CAUDAL;  Surgeon: Prem Kong MD;  Location: [facility] FOR PAIN MANAGEMENT    PATIENT DOCUMENTED NOT TO HAVE EXPERIENCED ANY OF THE FOLLOWING EVENTS  4/30/2014    Comment Procedure: CAUDAL;  [place] EPIDURAL/SUBARACHNOID; LUMBAR/SACRAL N/A 12/16/2014    Comment Procedure: CAUDAL;  Surgeon: Pablito Ng MD;  [facility] [address] [place] NDL/CATH SPI DX/THER [place] Meh[address] 84 N/A 12/16/2014    Comment Procedure: CAUDAL;  Surgeon [facility]    PACEMAKER/DEFIBRILLATOR      Comment Medtronic ICD    INJECTION, W/WO CONTRAST, DX/THERAPEUTIC SUBSTANCE, EPIDURAL/SUBARACHNOID; LUMBAR/SACRAL N/A 6/14/2016    Comment Procedure: CAUDAL;  Surgeon: Joya Farooq MD;  Location How much help from another person does the patient currently need. ..   -   Moving to and from a bed to a chair (including a wheelchair)?: A Little   -   Need to walk in hospital room?: A Little   -   Climbing 3-5 steps with a railing?: A Lot       AM-PAC S baseline and would benefit from skilled inpatient PT to address the above deficits to assist patient in returning to prior level of function. Subacute rehab is recommended for 11 to 14 days. After this period of rehabilitation patient should achieve mod I l Benign essential HTN      Past Medical History  Past Medical History   Diagnosis Date   • Unspecified essential hypertension    • Heart failure ([facility]    • Abnormal heart rhythm    • Atrial fibrillation ([facility]    • H/O cardiac arrest 2006   • CORONARY ZAID [place] FOR PAIN MANAGEMENT    PATIENT [place] PREOPERATIVE ORDER FOR IV ANTIBIOTIC SURGICAL SITE INFECTION PROPHYLAXIS.   10/22/2012    Comment Procedure: CAUDAL;  Surgeon: Camryn Hernandez MD;  Location: [facility]    INJECTION, W/WO CONTRAST, DX/THERAPEUTIC SUBSTANCE, EPIDURAL/SUBARACHNOID; LUMBAR/SACRAL  8/28/2013    Comment Procedure: CAUDAL;  Surgeon: Bala Riggins MD;  Location: [address] INJECTION, W/WO CONTRAST, DX/THERAPEUTIC SUBSTANCE, EPIDURAL/SUBARACHNOID; LUMBAR/SACRAL  4/30/2014    Comment Procedure: CAUDAL;  Surgeon: Leana Glasgow MD;  Location: [address] & [address] [facility] NDL/CATH SPI DX/THER [place] [address]  4/3 PATIENT [place] PREOPERATIVE ORDER FOR IV ANTIBIOTIC SURGICAL SITE INFECTION PROPHYLAXIS.  N/A 7/23/2014    Comment Procedure: CAUDAL;  Surgeon: Astrid Maki MD;  Location: [facility] FOR PAIN MANAGEMENT    PATIENT DOCUMENTED NOT TO HAVE EXPERIENCED Comment Procedure: CAUDAL;  Surgeon: Luis Alberto Basurto MD;  Location: [facility] FOR PAIN MANAGEMENT    PATIENT [address] [place] FOR IV ANTIBIOTIC SURGICAL SITE INFECTION PROPHYLAXIS.  N/A 2/5/2016    Comment Procedure: CAUDAL;  Surgeon: Mitch Maldonado Patient self-stated goal is return home    OBJECTIVE  Precautions: Low vision; Other (Comment) (L PAWEL/fused per patient)  Fall Risk: High fall risk    WEIGHT BEARING RESTRICTION  Weight Bearing Restriction: None                PAIN ASSESSMENT  Ratin  Lo Gait Assistance: Dependent assistance (actual mod assist)  Distance (ft): 3  Assistive Device: Rolling walker  Pattern: Shuffle;L Decreased stance time (minimal to no weight through L LE)  Stoop/Curb Assistance: Not tested  Comment : Scores per FIMS scale CURRENT GOALS    Goal #1 Patient is able to demonstrate supine - sit EOB @ level: minimum assistance     Goal #2 Patient is able to demonstrate transfers Sit to/from Stand at assistance level: minimum assistance     Goal #3 Patient is able to ambulate • Peripheral vascular disease (HCC)    • High blood pressure    • Cataract        Past Surgical History      Past Surgical History    BACK SURGERY      COLONOSCOPY      OTHER SURGICAL HISTORY      Comment abdominal aneuyrisum     CATARACT      HIP REPLACEM Comment Procedure: CAUDAL;  Surgeon: Bala Riggins MD;  Location: [address] & [place]anya [place] NDL/CATH SPI DX/THER [place] Meh[address]  1/29/2013    Comment Procedure: CAUDAL;  Surgeon: Bala Riggins MD;  Location: [address] [place] [address] Comment Procedure: CAUDAL;  Surgeon: Jaci Mandel MD;  Location: [facility] FOR PAIN MANAGEMENT    PATIENT DOCUMENTED NOT TO HAVE EXPERIENCED ANY OF THE FOLLOWING EVENTS  8/28/2013    Comment Procedure: CAUDAL;  Surgeon: Jaci Mandel MD;  Souleymane Bocanegra Comment Procedure: CAUDAL;  Surgeon: Jessenia [place] MD;  Location: [facility] FOR PAIN MANAGEMENT    PATIENT DOCUMENTED NOT TO HAVE EXPERIENCED ANY OF THE FOLLOWING EVENTS  4/30/2014    Comment Procedure: CAUDAL;  Surgeon: Jessenia [place] MD;  Estela Phipps EPIDURAL/SUBARACHNOID; LUMBAR/SACRAL N/A 12/16/2014    Comment Procedure: CAUDAL;  Surgeon: Pablito Ng MD;  [facility] [address] [place] NDL/CATH SPI DX/THER [place] Meh[address] Jennifer 84 N/A 12/16/2014    Comment Procedure: CAUDAL;  Surgeon Approx Degree of Impairment: 42.8%  Standardized Score (AM-PAC Scale): 40.22  CMS Modifier (G-Code): CK    FUNCTIONAL TRANSFER ASSESSMENT  Supine to Sit : Not tested  Sit to Stand:  Moderate assistance    Skilled Therapy Provided: Pt received up in the [facility] Frequency (Obs): 5x/week      OT Goals:     ADL Goals  Patient will perform all ADLs: with supervision    Functional Transfer Goals  Patient will perform all functional transfers:  with supervision    UE Exercise Program Goal  Patient will be supervision w Comment Procedure: CAUDAL;  Surgeon: Bala Riggins MD;  Location: [address] & [place]anya [place] NDL/CATH SPI DX/THER [place] Meh[address]  1/29/2013    Comment Procedure: CAUDAL;  Surgeon: Bala Riggins MD;  Location: [address] [place] [address] Comment Procedure: CAUDAL;  Surgeon: Jaci Mandel MD;  Location: [facility] FOR PAIN MANAGEMENT    PATIENT DOCUMENTED NOT TO HAVE EXPERIENCED ANY OF THE FOLLOWING EVENTS  8/28/2013    Comment Procedure: CAUDAL;  Surgeon: Jaci Mandel MD;  Souleymane Bocanegra Comment Procedure: CAUDAL;  Surgeon: Jessenia [place] MD;  Location: [facility] FOR PAIN MANAGEMENT    PATIENT DOCUMENTED NOT TO HAVE EXPERIENCED ANY OF THE FOLLOWING EVENTS  4/30/2014    Comment Procedure: CAUDAL;  Surgeon: Jessenia [place] MD;  Estela Phipps EPIDURAL/SUBARACHNOID; LUMBAR/SACRAL N/A 12/16/2014    Comment Procedure: CAUDAL;  Surgeon: Pablito Ng MD;  [facility] [address] [place] NDL/CATH SPI DX/THER [place] Meh[address] Jennifer 84 N/A 12/16/2014    Comment Procedure: CAUDAL;  Surgeon PERCEPTION  Overall Perception Status:   WFL - within functional limits    SENSATION  Light touch:  intact    Communication: Pt is able to communicate all basic needs and wants    Behavioral/Emotional/Social: Pt was participated in and was motivated for th within reach;RN aware of session/findings; All patient questions and concerns addressed    ASSESSMENT     Patient is a 80year old male admitted 2/20/2017 for Fem Pop Bypass.   In this OT evaluation patient presents with the following impairments: endurance,

## (undated) NOTE — IP AVS SNAPSHOT
[facility] [place] [address] [place], [address] ~ [phone]                Discharge Summary   2/20/2017    Andrey Gasca [facility] 63           Admission Information        Department    2/20/2017  6ne-A         Thank you for choosing [facility] [    ]    [    ]    [    ]    [    ]       aspirin 81 MG Tbec   Last time this was given:  325 mg on 2/23/2017  7:55 AM   Commonly known as:  ASPIR-81        Take 1 tablet (81 mg total) by mouth daily.     Maria R Mcrae     [    ]    [    ]    [    ]    [ [address] [place] [address]     Phone:  [phone]    - Lisinopril-Hydrochlorothiazide 10-12.5 MG Tabs            Follow-up Information     Follow up with Ryan Hassan MD.    Specialty:  CARDIOLOGY    Why:  or [facility] a week or two after d 11.9 (02/20/17)  10.0 (02/20/17)  1.7 (02/20/17)  0.1 -- (02/20/17)  5.70 (02/20/17)  0.89 (L) (02/20/17)  0.75 (H) (02/20/17)  0.13 (02/20/17)  1.45      Metabolic Lab Results  (Last result in the past 90 days)    HgbA1C Glucose BUN Creatinine Calcium Alk experience these side effects or respond to medications the same. Please call your provider or healthcare team if you have any questions regarding your medications while at home.          Blood Pressure and Cardiac Medications     Lisinopril-Hydrochlorothia no bowel movement in 2+ days, unresolved pain           Non-Narcotic Pain Medications     CELECOXIB 200 MG Oral Cap    aspirin (ASPIR-81) 81 MG Oral Tab EC       Use: Treat pain, fever, inflammation   Most common side effects: Stomach upset   What to repor